# Patient Record
Sex: MALE | Race: WHITE
[De-identification: names, ages, dates, MRNs, and addresses within clinical notes are randomized per-mention and may not be internally consistent; named-entity substitution may affect disease eponyms.]

---

## 2018-01-29 ENCOUNTER — HOSPITAL ENCOUNTER (OUTPATIENT)
Age: 73
Discharge: HOME | End: 2018-01-29

## 2018-01-29 ENCOUNTER — HOSPITAL ENCOUNTER (OUTPATIENT)
Dept: HOSPITAL 91 - GIL | Age: 73
Discharge: HOME | End: 2018-01-29
Payer: COMMERCIAL

## 2018-01-29 DIAGNOSIS — K31.89: ICD-10-CM

## 2018-01-29 DIAGNOSIS — E78.5: ICD-10-CM

## 2018-01-29 DIAGNOSIS — K57.90: ICD-10-CM

## 2018-01-29 DIAGNOSIS — K43.9: ICD-10-CM

## 2018-01-29 DIAGNOSIS — K76.6: ICD-10-CM

## 2018-01-29 DIAGNOSIS — E11.9: ICD-10-CM

## 2018-01-29 DIAGNOSIS — I85.00: Primary | ICD-10-CM

## 2018-01-29 DIAGNOSIS — K64.9: ICD-10-CM

## 2018-01-29 PROCEDURE — 43239 EGD BIOPSY SINGLE/MULTIPLE: CPT

## 2018-01-29 PROCEDURE — 82962 GLUCOSE BLOOD TEST: CPT

## 2020-08-25 ENCOUNTER — OFFICE (OUTPATIENT)
Dept: URBAN - METROPOLITAN AREA CLINIC 33 | Facility: CLINIC | Age: 75
End: 2020-08-25

## 2020-08-25 VITALS
HEIGHT: 67 IN | SYSTOLIC BLOOD PRESSURE: 150 MMHG | WEIGHT: 164 LBS | TEMPERATURE: 96.8 F | DIASTOLIC BLOOD PRESSURE: 82 MMHG

## 2020-08-25 DIAGNOSIS — K74.69 CIRRHOSIS, OTHER: ICD-10-CM

## 2020-08-25 DIAGNOSIS — R19.09 MASS OF PANCREAS: ICD-10-CM

## 2020-08-25 DIAGNOSIS — I85.00 ESOPHAGEAL VARICES: ICD-10-CM

## 2020-08-25 PROCEDURE — 99205 OFFICE O/P NEW HI 60 MIN: CPT | Performed by: SPECIALIST

## 2020-08-25 NOTE — SERVICEHPINOTES
The patient complains of symptoms suggestive of excessive intestinal gas.     Reports the onset of   bloating  several  months   ago  .    Symptoms now occur   several   times per   weeks  .    They may last up to   hours   at a time  .    Medications tried so far include   with   no   relief  .    Symptoms are worsened by   nothing specific  .   Bloating/gas is relieved by   nothing specific  .    Specific foods that potentially trigger symptoms include   milk/lactose  .    The patient has associated   abdominal bloating   with this presentation.   Alarm symptoms reported:   none  .

## 2020-09-04 ENCOUNTER — OFFICE (OUTPATIENT)
Dept: URBAN - METROPOLITAN AREA CLINIC 33 | Facility: CLINIC | Age: 75
End: 2020-09-04

## 2020-09-04 VITALS
HEIGHT: 67 IN | SYSTOLIC BLOOD PRESSURE: 136 MMHG | HEART RATE: 62 BPM | TEMPERATURE: 98.9 F | WEIGHT: 164 LBS | DIASTOLIC BLOOD PRESSURE: 77 MMHG

## 2020-09-04 DIAGNOSIS — I85.00 ESOPHAGEAL VARICES: ICD-10-CM

## 2020-09-04 DIAGNOSIS — K74.69 CIRRHOSIS, OTHER: ICD-10-CM

## 2020-09-04 DIAGNOSIS — R19.09 MASS OF PANCREAS: ICD-10-CM

## 2020-09-04 PROCEDURE — 99214 OFFICE O/P EST MOD 30 MIN: CPT | Performed by: SPECIALIST

## 2020-09-04 NOTE — SERVICEHPINOTES
Discussed MRCP results, unfortunately due to patient not being Cooperative with exam and not following technician instructions on respiration the exam is limited.

## 2020-09-30 ENCOUNTER — OFFICE (OUTPATIENT)
Dept: URBAN - METROPOLITAN AREA CLINIC 33 | Facility: CLINIC | Age: 75
End: 2020-09-30

## 2020-09-30 VITALS
TEMPERATURE: 97.4 F | SYSTOLIC BLOOD PRESSURE: 134 MMHG | WEIGHT: 164 LBS | HEIGHT: 67 IN | DIASTOLIC BLOOD PRESSURE: 64 MMHG

## 2020-09-30 DIAGNOSIS — I85.00 ESOPHAGEAL VARICES: ICD-10-CM

## 2020-09-30 DIAGNOSIS — R19.09 MASS OF PANCREAS: ICD-10-CM

## 2020-09-30 DIAGNOSIS — K74.69 CIRRHOSIS, OTHER: ICD-10-CM

## 2020-09-30 PROCEDURE — 99214 OFFICE O/P EST MOD 30 MIN: CPT | Performed by: SPECIALIST

## 2020-09-30 NOTE — SERVICEHPINOTES
MRI confirms finding of 1.9 cm mass    in [place]. He has Cirrhosis, portal hypertension and esophageal varicies. Patient and daughter understand EUS is a high risk procedure in this setting.

## 2020-12-02 ENCOUNTER — OFFICE (OUTPATIENT)
Dept: URBAN - METROPOLITAN AREA CLINIC 33 | Facility: CLINIC | Age: 75
End: 2020-12-02

## 2020-12-02 VITALS — HEIGHT: 67 IN

## 2020-12-02 DIAGNOSIS — I85.00 ESOPHAGEAL VARICES: ICD-10-CM

## 2020-12-02 DIAGNOSIS — K74.69 CIRRHOSIS, OTHER: ICD-10-CM

## 2020-12-02 DIAGNOSIS — R19.09 MASS OF PANCREAS: ICD-10-CM

## 2020-12-02 PROCEDURE — 99214 OFFICE O/P EST MOD 30 MIN: CPT | Performed by: SPECIALIST

## 2020-12-02 PROCEDURE — G0406 INPT/TELE FOLLOW UP 15: HCPCS | Performed by: SPECIALIST

## 2022-08-05 ENCOUNTER — HOSPITAL ENCOUNTER (EMERGENCY)
Dept: HOSPITAL 12 - ER | Age: 77
LOS: 1 days | Discharge: ANOTHER HOSPITAL | End: 2022-08-06
Payer: MEDICARE

## 2022-08-05 VITALS — HEIGHT: 66 IN | WEIGHT: 125 LBS | BODY MASS INDEX: 20.09 KG/M2

## 2022-08-05 DIAGNOSIS — F03.90: ICD-10-CM

## 2022-08-05 DIAGNOSIS — I50.9: ICD-10-CM

## 2022-08-05 DIAGNOSIS — K21.9: ICD-10-CM

## 2022-08-05 DIAGNOSIS — N40.0: ICD-10-CM

## 2022-08-05 DIAGNOSIS — E11.9: ICD-10-CM

## 2022-08-05 DIAGNOSIS — I62.01: ICD-10-CM

## 2022-08-05 DIAGNOSIS — E72.20: Primary | ICD-10-CM

## 2022-08-05 DIAGNOSIS — Z20.822: ICD-10-CM

## 2022-08-05 DIAGNOSIS — Z79.899: ICD-10-CM

## 2022-08-05 DIAGNOSIS — K76.9: ICD-10-CM

## 2022-08-05 DIAGNOSIS — N39.0: ICD-10-CM

## 2022-08-05 DIAGNOSIS — Z74.01: ICD-10-CM

## 2022-08-05 DIAGNOSIS — Z79.84: ICD-10-CM

## 2022-08-05 DIAGNOSIS — R41.0: ICD-10-CM

## 2022-08-05 LAB
ALP SERPL-CCNC: 149 U/L (ref 50–136)
ALT SERPL W/O P-5'-P-CCNC: 17 U/L (ref 16–63)
AMPHETAMINES UR QL SCN>1000 NG/ML: NEGATIVE
APAP SERPL-MCNC: < 2 UG/ML (ref 10–30)
APPEARANCE UR: (no result)
AST SERPL-CCNC: 22 U/L (ref 15–37)
BILIRUB DIRECT SERPL-MCNC: 0.4 MG/DL (ref 0–0.2)
BILIRUB SERPL-MCNC: 1.3 MG/DL (ref 0.2–1)
BILIRUB UR QL STRIP: NEGATIVE
BUN SERPL-MCNC: 17 MG/DL (ref 7–18)
CHLORIDE SERPL-SCNC: 105 MMOL/L (ref 98–107)
CO2 SERPL-SCNC: 24 MMOL/L (ref 21–32)
COCAINE UR QL SCN: NEGATIVE
COLOR UR: YELLOW
CREAT SERPL-MCNC: 1.4 MG/DL (ref 0.6–1.3)
DEPRECATED SQUAMOUS URNS QL MICRO: (no result) /HPF
ETHANOL SERPL-MCNC: < 3 MG/DL (ref 0–0)
GLUCOSE SERPL-MCNC: 228 MG/DL (ref 74–106)
GLUCOSE UR STRIP-MCNC: NEGATIVE MG/DL
HCT VFR BLD AUTO: 36.8 % (ref 36.7–47.1)
HGB UR QL STRIP: (no result)
KETONES UR STRIP-MCNC: NEGATIVE MG/DL
LEUKOCYTE ESTERASE UR QL STRIP: (no result)
MCH RBC QN AUTO: 33.4 UUG (ref 23.8–33.4)
MCV RBC AUTO: 97.7 FL (ref 73–96.2)
NITRITE UR QL STRIP: POSITIVE
OPIATES UR QL SCN: NEGATIVE
PCP UR QL SCN>25 NG/ML: NEGATIVE
PH UR STRIP: 5.5 [PH] (ref 5–8)
PLATELET # BLD AUTO: 92 K/UL (ref 152–348)
POTASSIUM SERPL-SCNC: 4.2 MMOL/L (ref 3.5–5.1)
RBC #/AREA URNS HPF: (no result) /HPF (ref 0–3)
SP GR UR STRIP: 1.02 (ref 1–1.03)
THC UR QL SCN>50 NG/ML: NEGATIVE
TSH SERPL DL<=0.005 MIU/L-ACNC: 1.68 MIU/ML (ref 0.36–3.74)
UROBILINOGEN UR STRIP-MCNC: 0.2 E.U./DL
WBC #/AREA URNS HPF: (no result) /HPF
WBC #/AREA URNS HPF: (no result) /HPF (ref 0–3)
WS STN SPEC: 8.5 G/DL (ref 6.4–8.2)

## 2022-08-05 PROCEDURE — 71045 X-RAY EXAM CHEST 1 VIEW: CPT

## 2022-08-05 PROCEDURE — 82140 ASSAY OF AMMONIA: CPT

## 2022-08-05 PROCEDURE — 80076 HEPATIC FUNCTION PANEL: CPT

## 2022-08-05 PROCEDURE — 87086 URINE CULTURE/COLONY COUNT: CPT

## 2022-08-05 PROCEDURE — 87040 BLOOD CULTURE FOR BACTERIA: CPT

## 2022-08-05 PROCEDURE — 80299 QUANTITATIVE ASSAY DRUG: CPT

## 2022-08-05 PROCEDURE — 84484 ASSAY OF TROPONIN QUANT: CPT

## 2022-08-05 PROCEDURE — 96375 TX/PRO/DX INJ NEW DRUG ADDON: CPT

## 2022-08-05 PROCEDURE — 80048 BASIC METABOLIC PNL TOTAL CA: CPT

## 2022-08-05 PROCEDURE — 93005 ELECTROCARDIOGRAM TRACING: CPT

## 2022-08-05 PROCEDURE — 83605 ASSAY OF LACTIC ACID: CPT

## 2022-08-05 PROCEDURE — 80307 DRUG TEST PRSMV CHEM ANLYZR: CPT

## 2022-08-05 PROCEDURE — 87426 SARSCOV CORONAVIRUS AG IA: CPT

## 2022-08-05 PROCEDURE — 70450 CT HEAD/BRAIN W/O DYE: CPT

## 2022-08-05 PROCEDURE — 85025 COMPLETE CBC W/AUTO DIFF WBC: CPT

## 2022-08-05 PROCEDURE — G0480 DRUG TEST DEF 1-7 CLASSES: HCPCS

## 2022-08-05 PROCEDURE — 99291 CRITICAL CARE FIRST HOUR: CPT

## 2022-08-05 PROCEDURE — 81001 URINALYSIS AUTO W/SCOPE: CPT

## 2022-08-05 PROCEDURE — 80320 DRUG SCREEN QUANTALCOHOLS: CPT

## 2022-08-05 PROCEDURE — A4663 DIALYSIS BLOOD PRESSURE CUFF: HCPCS

## 2022-08-05 PROCEDURE — 84443 ASSAY THYROID STIM HORMONE: CPT

## 2022-08-05 PROCEDURE — 85730 THROMBOPLASTIN TIME PARTIAL: CPT

## 2022-08-05 PROCEDURE — 96365 THER/PROPH/DIAG IV INF INIT: CPT

## 2022-08-05 PROCEDURE — 36415 COLL VENOUS BLD VENIPUNCTURE: CPT

## 2022-08-05 NOTE — NUR
spoke with Anju at the transfer center as the pt will be transferred to 
[facility]. She requested I fax the face sheet and covid results.

## 2022-08-05 NOTE — NUR
Per Dr. Saucedo he received a call back from Dr. Scruggs and the pt will go to 
[facility].

## 2022-08-06 NOTE — NUR
-------------------------------------------------------------------------------

           *** Note undone in [facility] - 08/06/22 at 0203 by PATRICIO ***            

-------------------------------------------------------------------------------

pt was transported via w/c to room 329 martin Trujillo in room to receive the 
pt.

## 2022-08-06 NOTE — NUR
spoke with Elsie at [place] center at  states the pt will go to 
room 4411 and number for report is  at [facility].

## 2022-08-06 NOTE — NUR
report was given to Abdoul BALLARD with [facility] transport pt to go to [facility] room 4411.  I called [facility] at  and spoke 
with Tres the monitor technician to inform them that the pt will now be 
leaving with [facility] transport.

## 2022-08-06 NOTE — NUR
call to [facility] at .  Was told by Anju that they 
are still waiting to find out about transfer.

## 2022-08-06 NOTE — NUR
report given to Pearl BALLARD at [facility] to go to room 4411 number 
that was called was .   I told her I do not have a transport time 
as of yet but I will call her when I am notified.

## 2022-08-06 NOTE — NUR
spoke with Anju at the transfer center she states they are still unaware of 
when transport will arrive they do not have a time currently.

## 2022-09-19 ENCOUNTER — HOSPITAL ENCOUNTER (INPATIENT)
Dept: HOSPITAL 12 - ER | Age: 77
LOS: 9 days | Discharge: NURSING, REHAB OR LONG-TERM CARE | DRG: 871 | End: 2022-09-28
Payer: MEDICARE

## 2022-09-19 VITALS — DIASTOLIC BLOOD PRESSURE: 76 MMHG | SYSTOLIC BLOOD PRESSURE: 135 MMHG

## 2022-09-19 VITALS — DIASTOLIC BLOOD PRESSURE: 73 MMHG | SYSTOLIC BLOOD PRESSURE: 146 MMHG

## 2022-09-19 VITALS — DIASTOLIC BLOOD PRESSURE: 51 MMHG | SYSTOLIC BLOOD PRESSURE: 84 MMHG

## 2022-09-19 VITALS — DIASTOLIC BLOOD PRESSURE: 61 MMHG | SYSTOLIC BLOOD PRESSURE: 111 MMHG

## 2022-09-19 VITALS — BODY MASS INDEX: 19.85 KG/M2 | WEIGHT: 131 LBS | HEIGHT: 68 IN

## 2022-09-19 VITALS — DIASTOLIC BLOOD PRESSURE: 52 MMHG | SYSTOLIC BLOOD PRESSURE: 91 MMHG

## 2022-09-19 DIAGNOSIS — N40.0: ICD-10-CM

## 2022-09-19 DIAGNOSIS — J94.8: ICD-10-CM

## 2022-09-19 DIAGNOSIS — N17.9: ICD-10-CM

## 2022-09-19 DIAGNOSIS — D68.9: ICD-10-CM

## 2022-09-19 DIAGNOSIS — K76.7: ICD-10-CM

## 2022-09-19 DIAGNOSIS — D69.59: ICD-10-CM

## 2022-09-19 DIAGNOSIS — D75.89: ICD-10-CM

## 2022-09-19 DIAGNOSIS — E83.39: ICD-10-CM

## 2022-09-19 DIAGNOSIS — A41.9: Primary | ICD-10-CM

## 2022-09-19 DIAGNOSIS — J69.0: ICD-10-CM

## 2022-09-19 DIAGNOSIS — K70.31: ICD-10-CM

## 2022-09-19 DIAGNOSIS — N18.30: ICD-10-CM

## 2022-09-19 DIAGNOSIS — Z16.12: ICD-10-CM

## 2022-09-19 DIAGNOSIS — Z79.84: ICD-10-CM

## 2022-09-19 DIAGNOSIS — K72.90: ICD-10-CM

## 2022-09-19 DIAGNOSIS — E87.6: ICD-10-CM

## 2022-09-19 DIAGNOSIS — I13.0: ICD-10-CM

## 2022-09-19 DIAGNOSIS — B96.20: ICD-10-CM

## 2022-09-19 DIAGNOSIS — E83.42: ICD-10-CM

## 2022-09-19 DIAGNOSIS — F03.90: ICD-10-CM

## 2022-09-19 DIAGNOSIS — N39.0: ICD-10-CM

## 2022-09-19 DIAGNOSIS — J98.11: ICD-10-CM

## 2022-09-19 DIAGNOSIS — I50.9: ICD-10-CM

## 2022-09-19 DIAGNOSIS — K21.9: ICD-10-CM

## 2022-09-19 DIAGNOSIS — Z66: ICD-10-CM

## 2022-09-19 DIAGNOSIS — J96.01: ICD-10-CM

## 2022-09-19 DIAGNOSIS — E11.22: ICD-10-CM

## 2022-09-19 DIAGNOSIS — E87.2: ICD-10-CM

## 2022-09-19 DIAGNOSIS — I62.00: ICD-10-CM

## 2022-09-19 DIAGNOSIS — J90: ICD-10-CM

## 2022-09-19 DIAGNOSIS — D64.9: ICD-10-CM

## 2022-09-19 DIAGNOSIS — E43: ICD-10-CM

## 2022-09-19 DIAGNOSIS — Z87.440: ICD-10-CM

## 2022-09-19 DIAGNOSIS — R65.21: ICD-10-CM

## 2022-09-19 DIAGNOSIS — E88.09: ICD-10-CM

## 2022-09-19 DIAGNOSIS — Z20.822: ICD-10-CM

## 2022-09-19 LAB
ALP SERPL-CCNC: 119 U/L (ref 50–136)
ALT SERPL W/O P-5'-P-CCNC: 16 U/L (ref 16–63)
AST SERPL-CCNC: 29 U/L (ref 15–37)
BILIRUB DIRECT SERPL-MCNC: 1 MG/DL (ref 0–0.2)
BILIRUB SERPL-MCNC: 2.9 MG/DL (ref 0.2–1)
BUN SERPL-MCNC: 19 MG/DL (ref 7–18)
CHLORIDE SERPL-SCNC: 104 MMOL/L (ref 98–107)
CO2 SERPL-SCNC: 21 MMOL/L (ref 21–32)
CREAT SERPL-MCNC: 1.5 MG/DL (ref 0.6–1.3)
GLUCOSE SERPL-MCNC: 169 MG/DL (ref 74–106)
HCT VFR BLD AUTO: 38.1 % (ref 36.7–47.1)
MCH RBC QN AUTO: 36 UUG (ref 23.8–33.4)
MCV RBC AUTO: 105.8 FL (ref 73–96.2)
PLATELET # BLD AUTO: 107 K/UL (ref 152–348)
POTASSIUM SERPL-SCNC: 4.8 MMOL/L (ref 3.5–5.1)
WS STN SPEC: 8.6 G/DL (ref 6.4–8.2)

## 2022-09-19 PROCEDURE — 0BH17EZ INSERTION OF ENDOTRACHEAL AIRWAY INTO TRACHEA, VIA NATURAL OR ARTIFICIAL OPENING: ICD-10-PCS

## 2022-09-19 PROCEDURE — A6213 FOAM DRG >16<=48 SQ IN W/BDR: HCPCS

## 2022-09-19 PROCEDURE — A6209 FOAM DRSG <=16 SQ IN W/O BDR: HCPCS

## 2022-09-19 PROCEDURE — C9113 INJ PANTOPRAZOLE SODIUM, VIA: HCPCS

## 2022-09-19 PROCEDURE — G0378 HOSPITAL OBSERVATION PER HR: HCPCS

## 2022-09-19 PROCEDURE — B548ZZA ULTRASONOGRAPHY OF SUPERIOR VENA CAVA, GUIDANCE: ICD-10-PCS

## 2022-09-19 PROCEDURE — 5A1945Z RESPIRATORY VENTILATION, 24-96 CONSECUTIVE HOURS: ICD-10-PCS | Performed by: INTERNAL MEDICINE

## 2022-09-19 PROCEDURE — 02HV33Z INSERTION OF INFUSION DEVICE INTO SUPERIOR VENA CAVA, PERCUTANEOUS APPROACH: ICD-10-PCS

## 2022-09-19 PROCEDURE — P9047 ALBUMIN (HUMAN), 25%, 50ML: HCPCS

## 2022-09-19 RX ADMIN — RIFAXIMIN SCH MG: 550 TABLET ORAL at 23:43

## 2022-09-19 RX ADMIN — LACTULOSE SCH G: 20 SOLUTION ORAL at 21:30

## 2022-09-19 NOTE — NUR
Pt received report from NELLIE Lantigua,pt is intubated and sedated on propofol. Tube size 7.5 
with 24cm lip line.VENT on rate 16, Tv 450, peep 5, FIO2 100%, O2 sat within desired limits. 
Cardiac wise, NSR, SBP within normal limits. NG -T inserted and clamped. . Bledsoe Catheter 16 
inserted and is on gravity with adequate output.

## 2022-09-19 NOTE — NUR
called outside pharmacy,spoke to Chiquis, verified 2 lactulose orders from 2 different 
DRs. one is now and one is QID.Advised to give the stat one and go to the routine time on 
the other meds

## 2022-09-20 VITALS — SYSTOLIC BLOOD PRESSURE: 85 MMHG | DIASTOLIC BLOOD PRESSURE: 47 MMHG

## 2022-09-20 VITALS — SYSTOLIC BLOOD PRESSURE: 90 MMHG | DIASTOLIC BLOOD PRESSURE: 50 MMHG

## 2022-09-20 VITALS — DIASTOLIC BLOOD PRESSURE: 65 MMHG | SYSTOLIC BLOOD PRESSURE: 123 MMHG

## 2022-09-20 VITALS — DIASTOLIC BLOOD PRESSURE: 52 MMHG | SYSTOLIC BLOOD PRESSURE: 97 MMHG

## 2022-09-20 VITALS — SYSTOLIC BLOOD PRESSURE: 88 MMHG | DIASTOLIC BLOOD PRESSURE: 50 MMHG

## 2022-09-20 VITALS — SYSTOLIC BLOOD PRESSURE: 104 MMHG | DIASTOLIC BLOOD PRESSURE: 65 MMHG

## 2022-09-20 VITALS — DIASTOLIC BLOOD PRESSURE: 46 MMHG | SYSTOLIC BLOOD PRESSURE: 83 MMHG

## 2022-09-20 VITALS — DIASTOLIC BLOOD PRESSURE: 49 MMHG | SYSTOLIC BLOOD PRESSURE: 79 MMHG

## 2022-09-20 VITALS — SYSTOLIC BLOOD PRESSURE: 130 MMHG | DIASTOLIC BLOOD PRESSURE: 71 MMHG

## 2022-09-20 VITALS — SYSTOLIC BLOOD PRESSURE: 128 MMHG | DIASTOLIC BLOOD PRESSURE: 70 MMHG

## 2022-09-20 VITALS — SYSTOLIC BLOOD PRESSURE: 104 MMHG | DIASTOLIC BLOOD PRESSURE: 53 MMHG

## 2022-09-20 VITALS — SYSTOLIC BLOOD PRESSURE: 97 MMHG | DIASTOLIC BLOOD PRESSURE: 49 MMHG

## 2022-09-20 VITALS — SYSTOLIC BLOOD PRESSURE: 126 MMHG | DIASTOLIC BLOOD PRESSURE: 77 MMHG

## 2022-09-20 VITALS — DIASTOLIC BLOOD PRESSURE: 52 MMHG | SYSTOLIC BLOOD PRESSURE: 101 MMHG

## 2022-09-20 VITALS — DIASTOLIC BLOOD PRESSURE: 65 MMHG | SYSTOLIC BLOOD PRESSURE: 115 MMHG

## 2022-09-20 VITALS — DIASTOLIC BLOOD PRESSURE: 53 MMHG | SYSTOLIC BLOOD PRESSURE: 92 MMHG

## 2022-09-20 VITALS — DIASTOLIC BLOOD PRESSURE: 45 MMHG | SYSTOLIC BLOOD PRESSURE: 75 MMHG

## 2022-09-20 VITALS — SYSTOLIC BLOOD PRESSURE: 99 MMHG | DIASTOLIC BLOOD PRESSURE: 61 MMHG

## 2022-09-20 VITALS — SYSTOLIC BLOOD PRESSURE: 100 MMHG | DIASTOLIC BLOOD PRESSURE: 53 MMHG

## 2022-09-20 VITALS — SYSTOLIC BLOOD PRESSURE: 101 MMHG | DIASTOLIC BLOOD PRESSURE: 57 MMHG

## 2022-09-20 VITALS — SYSTOLIC BLOOD PRESSURE: 125 MMHG | DIASTOLIC BLOOD PRESSURE: 68 MMHG

## 2022-09-20 VITALS — SYSTOLIC BLOOD PRESSURE: 105 MMHG | DIASTOLIC BLOOD PRESSURE: 49 MMHG

## 2022-09-20 VITALS — SYSTOLIC BLOOD PRESSURE: 87 MMHG | DIASTOLIC BLOOD PRESSURE: 45 MMHG

## 2022-09-20 VITALS — DIASTOLIC BLOOD PRESSURE: 56 MMHG | SYSTOLIC BLOOD PRESSURE: 107 MMHG

## 2022-09-20 VITALS — SYSTOLIC BLOOD PRESSURE: 95 MMHG | DIASTOLIC BLOOD PRESSURE: 54 MMHG

## 2022-09-20 VITALS — SYSTOLIC BLOOD PRESSURE: 116 MMHG | DIASTOLIC BLOOD PRESSURE: 60 MMHG

## 2022-09-20 VITALS — SYSTOLIC BLOOD PRESSURE: 129 MMHG | DIASTOLIC BLOOD PRESSURE: 60 MMHG

## 2022-09-20 VITALS — SYSTOLIC BLOOD PRESSURE: 85 MMHG | DIASTOLIC BLOOD PRESSURE: 51 MMHG

## 2022-09-20 VITALS — SYSTOLIC BLOOD PRESSURE: 108 MMHG | DIASTOLIC BLOOD PRESSURE: 57 MMHG

## 2022-09-20 VITALS — DIASTOLIC BLOOD PRESSURE: 53 MMHG | SYSTOLIC BLOOD PRESSURE: 94 MMHG

## 2022-09-20 VITALS — SYSTOLIC BLOOD PRESSURE: 107 MMHG | DIASTOLIC BLOOD PRESSURE: 56 MMHG

## 2022-09-20 VITALS — SYSTOLIC BLOOD PRESSURE: 97 MMHG | DIASTOLIC BLOOD PRESSURE: 56 MMHG

## 2022-09-20 VITALS — DIASTOLIC BLOOD PRESSURE: 60 MMHG | SYSTOLIC BLOOD PRESSURE: 104 MMHG

## 2022-09-20 VITALS — DIASTOLIC BLOOD PRESSURE: 62 MMHG | SYSTOLIC BLOOD PRESSURE: 138 MMHG

## 2022-09-20 VITALS — DIASTOLIC BLOOD PRESSURE: 54 MMHG | SYSTOLIC BLOOD PRESSURE: 96 MMHG

## 2022-09-20 VITALS — DIASTOLIC BLOOD PRESSURE: 61 MMHG | SYSTOLIC BLOOD PRESSURE: 99 MMHG

## 2022-09-20 VITALS — DIASTOLIC BLOOD PRESSURE: 57 MMHG | SYSTOLIC BLOOD PRESSURE: 108 MMHG

## 2022-09-20 VITALS — SYSTOLIC BLOOD PRESSURE: 92 MMHG | DIASTOLIC BLOOD PRESSURE: 53 MMHG

## 2022-09-20 VITALS — SYSTOLIC BLOOD PRESSURE: 87 MMHG | DIASTOLIC BLOOD PRESSURE: 48 MMHG

## 2022-09-20 VITALS — DIASTOLIC BLOOD PRESSURE: 57 MMHG | SYSTOLIC BLOOD PRESSURE: 110 MMHG

## 2022-09-20 VITALS — SYSTOLIC BLOOD PRESSURE: 107 MMHG | DIASTOLIC BLOOD PRESSURE: 59 MMHG

## 2022-09-20 VITALS — SYSTOLIC BLOOD PRESSURE: 122 MMHG | DIASTOLIC BLOOD PRESSURE: 65 MMHG

## 2022-09-20 VITALS — SYSTOLIC BLOOD PRESSURE: 111 MMHG | DIASTOLIC BLOOD PRESSURE: 60 MMHG

## 2022-09-20 VITALS — DIASTOLIC BLOOD PRESSURE: 59 MMHG | SYSTOLIC BLOOD PRESSURE: 106 MMHG

## 2022-09-20 VITALS — SYSTOLIC BLOOD PRESSURE: 120 MMHG | DIASTOLIC BLOOD PRESSURE: 71 MMHG

## 2022-09-20 VITALS — SYSTOLIC BLOOD PRESSURE: 83 MMHG | DIASTOLIC BLOOD PRESSURE: 47 MMHG

## 2022-09-20 VITALS — SYSTOLIC BLOOD PRESSURE: 113 MMHG | DIASTOLIC BLOOD PRESSURE: 60 MMHG

## 2022-09-20 VITALS — SYSTOLIC BLOOD PRESSURE: 100 MMHG | DIASTOLIC BLOOD PRESSURE: 55 MMHG

## 2022-09-20 VITALS — DIASTOLIC BLOOD PRESSURE: 52 MMHG | SYSTOLIC BLOOD PRESSURE: 95 MMHG

## 2022-09-20 VITALS — DIASTOLIC BLOOD PRESSURE: 54 MMHG | SYSTOLIC BLOOD PRESSURE: 105 MMHG

## 2022-09-20 VITALS — SYSTOLIC BLOOD PRESSURE: 127 MMHG | DIASTOLIC BLOOD PRESSURE: 71 MMHG

## 2022-09-20 VITALS — SYSTOLIC BLOOD PRESSURE: 121 MMHG | DIASTOLIC BLOOD PRESSURE: 58 MMHG

## 2022-09-20 VITALS — SYSTOLIC BLOOD PRESSURE: 98 MMHG | DIASTOLIC BLOOD PRESSURE: 50 MMHG

## 2022-09-20 VITALS — SYSTOLIC BLOOD PRESSURE: 85 MMHG | DIASTOLIC BLOOD PRESSURE: 48 MMHG

## 2022-09-20 VITALS — DIASTOLIC BLOOD PRESSURE: 56 MMHG | SYSTOLIC BLOOD PRESSURE: 104 MMHG

## 2022-09-20 LAB
ALP SERPL-CCNC: 92 U/L (ref 50–136)
ALT SERPL W/O P-5'-P-CCNC: 17 U/L (ref 16–63)
APPEARANCE UR: CLEAR
AST SERPL-CCNC: 22 U/L (ref 15–37)
BASE EXCESS BLDA CALC-SCNC: -5.2 MMOL/L
BASE EXCESS BLDA CALC-SCNC: -6 MMOL/L
BILIRUB SERPL-MCNC: 2.7 MG/DL (ref 0.2–1)
BILIRUB UR QL STRIP: NEGATIVE
BUN SERPL-MCNC: 22 MG/DL (ref 7–18)
CHLORIDE SERPL-SCNC: 107 MMOL/L (ref 98–107)
CO2 SERPL-SCNC: 19 MMOL/L (ref 21–32)
COLOR UR: YELLOW
CREAT SERPL-MCNC: 1.5 MG/DL (ref 0.6–1.3)
DEPRECATED SQUAMOUS URNS QL MICRO: (no result) /HPF
GLUCOSE SERPL-MCNC: 170 MG/DL (ref 74–106)
GLUCOSE UR STRIP-MCNC: NEGATIVE MG/DL
HCO3 BLDA-SCNC: 16 MMOL/L
HCO3 BLDA-SCNC: 16.4 MMOL/L
HCT VFR BLD AUTO: 32.2 % (ref 36.7–47.1)
HGB BLDA OXIMETRY-MCNC: 11.2 G/DL (ref 13.5–18)
HGB BLDA OXIMETRY-MCNC: 12.2 G/DL (ref 13.5–18)
HGB UR QL STRIP: (no result)
INHALED O2 CONCENTRATION: 100 %
INHALED O2 CONCENTRATION: 80 %
INTRINSIC PEEP RESPIRATORY: 5 CMH20
INTRINSIC PEEP RESPIRATORY: 5 CMH20
KETONES UR STRIP-MCNC: (no result) MG/DL
LEUKOCYTE ESTERASE UR QL STRIP: (no result)
MCH RBC QN AUTO: 36.5 UUG (ref 23.8–33.4)
MCV RBC AUTO: 104.8 FL (ref 73–96.2)
NITRITE UR QL STRIP: NEGATIVE
PCO2 TEMP ADJ BLDA: 21.8 MMHG (ref 35–45)
PCO2 TEMP ADJ BLDA: 22.9 MMHG (ref 35–45)
PEEP SETTING VENT: 45 ML
PEEP SETTING VENT: 450 ML
PH TEMP ADJ BLDA: 7.46 [PH] (ref 7.35–7.45)
PH TEMP ADJ BLDA: 7.5 [PH] (ref 7.35–7.45)
PH UR STRIP: 5.5 [PH] (ref 5–8)
PHOSPHATE SERPL-MCNC: 3.7 MG/DL (ref 2.5–4.9)
PLATELET # BLD AUTO: 91 K/UL (ref 152–348)
PO2 TEMP ADJ BLDA: 108.3 MMHG (ref 75–100)
PO2 TEMP ADJ BLDA: 170.1 MMHG (ref 75–100)
POTASSIUM SERPL-SCNC: 3.7 MMOL/L (ref 3.5–5.1)
RBC #/AREA URNS HPF: (no result) /HPF (ref 0–3)
SET RATE, BG: 16
SET RATE, BG: 16
SP GR UR STRIP: 1.02 (ref 1–1.03)
SPECIMEN DRAWN FROM PATIENT: (no result)
SPECIMEN DRAWN FROM PATIENT: (no result)
UROBILINOGEN UR STRIP-MCNC: 0.2 E.U./DL
VENTILATION MODE VENT: (no result)
VENTILATION MODE VENT: (no result)
WBC #/AREA URNS HPF: (no result) /HPF
WS STN SPEC: 7.3 G/DL (ref 6.4–8.2)

## 2022-09-20 RX ADMIN — TAMSULOSIN HYDROCHLORIDE SCH MG: 0.4 CAPSULE ORAL at 09:37

## 2022-09-20 RX ADMIN — Medication SCH EACH: at 06:35

## 2022-09-20 RX ADMIN — FLUTICASONE FUROATE AND VILANTEROL TRIFENATATE SCH EACH: 100; 25 POWDER RESPIRATORY (INHALATION) at 09:00

## 2022-09-20 RX ADMIN — PROPRANOLOL HYDROCHLORIDE SCH MG: 10 TABLET ORAL at 09:00

## 2022-09-20 RX ADMIN — LACTULOSE SCH G: 20 SOLUTION ORAL at 13:16

## 2022-09-20 RX ADMIN — PROPRANOLOL HYDROCHLORIDE SCH MG: 10 TABLET ORAL at 16:17

## 2022-09-20 RX ADMIN — Medication SCH EACH: at 12:08

## 2022-09-20 RX ADMIN — HEPARIN SODIUM SCH UNITS: 5000 INJECTION, SOLUTION INTRAVENOUS; SUBCUTANEOUS at 20:12

## 2022-09-20 RX ADMIN — PIPERACILLIN SODIUM AND TAZOBACTAM SODIUM SCH MLS/HR: .375; 3 INJECTION, POWDER, LYOPHILIZED, FOR SOLUTION INTRAVENOUS at 23:24

## 2022-09-20 RX ADMIN — LACTULOSE SCH G: 20 SOLUTION ORAL at 16:16

## 2022-09-20 RX ADMIN — MEMANTINE HYDROCHLORIDE SCH MG: 10 TABLET ORAL at 20:30

## 2022-09-20 RX ADMIN — PIPERACILLIN SODIUM AND TAZOBACTAM SODIUM SCH MLS/HR: .375; 3 INJECTION, POWDER, LYOPHILIZED, FOR SOLUTION INTRAVENOUS at 16:13

## 2022-09-20 RX ADMIN — SODIUM CHLORIDE PRN UNIT: 9 INJECTION, SOLUTION INTRAVENOUS at 16:45

## 2022-09-20 RX ADMIN — Medication SCH EACH: at 16:44

## 2022-09-20 RX ADMIN — SODIUM CHLORIDE PRN UNIT: 9 INJECTION, SOLUTION INTRAVENOUS at 12:13

## 2022-09-20 RX ADMIN — LACTULOSE SCH G: 20 SOLUTION ORAL at 09:35

## 2022-09-20 RX ADMIN — Medication SCH EACH: at 20:51

## 2022-09-20 RX ADMIN — LACTULOSE SCH G: 20 SOLUTION ORAL at 20:30

## 2022-09-20 RX ADMIN — HEPARIN SODIUM SCH UNITS: 5000 INJECTION, SOLUTION INTRAVENOUS; SUBCUTANEOUS at 09:40

## 2022-09-20 RX ADMIN — SODIUM CHLORIDE PRN UNIT: 9 INJECTION, SOLUTION INTRAVENOUS at 21:02

## 2022-09-20 RX ADMIN — PROPOFOL PRN MLS/HR: 10 INJECTION, EMULSION INTRAVENOUS at 16:13

## 2022-09-20 RX ADMIN — PIPERACILLIN SODIUM AND TAZOBACTAM SODIUM SCH MLS/HR: .375; 3 INJECTION, POWDER, LYOPHILIZED, FOR SOLUTION INTRAVENOUS at 09:29

## 2022-09-20 RX ADMIN — ATORVASTATIN CALCIUM SCH MG: 20 TABLET, FILM COATED ORAL at 20:30

## 2022-09-20 RX ADMIN — RIFAXIMIN SCH MG: 550 TABLET ORAL at 09:37

## 2022-09-20 RX ADMIN — OXYBUTYNIN CHLORIDE SCH MG: 5 TABLET, EXTENDED RELEASE ORAL at 20:30

## 2022-09-20 RX ADMIN — RIFAXIMIN SCH MG: 550 TABLET ORAL at 20:30

## 2022-09-20 NOTE — NUR
Pt is stable on vent, Fio2 titrated to 40% during shift. Spo2 and respirations wnl. ETT 
repositioned Q2, ett/oral sxn prn. No resp. distress noted throughout shift. Will continue 
to monitor and follow current respiratory treatments as ordered.

## 2022-09-20 NOTE — NUR
Pt received intubated on CMV, 7.5 ETT secured with anchorfast @ 23cm lip line.  Fio2 
titrated to 90%. RN notified. Spo2 and respirations wnl. ETT repositioned. ETT/oral sxn.

## 2022-09-20 NOTE — NUR
Received pt sedated and intubated, on VENT rate 16, Tv 450, FIO2 40%, peep 5. O2 sat within 
desired limits. Cardiac wise, NSR, SBP within desired limits. Bledsoe catheter is on 
gravity.NGT clamped . 3 lumen Central line on right internal jugular,intact and patent. Will 
continue to monitor.

## 2022-09-21 VITALS — DIASTOLIC BLOOD PRESSURE: 63 MMHG | SYSTOLIC BLOOD PRESSURE: 108 MMHG

## 2022-09-21 VITALS — SYSTOLIC BLOOD PRESSURE: 121 MMHG | DIASTOLIC BLOOD PRESSURE: 59 MMHG

## 2022-09-21 VITALS — SYSTOLIC BLOOD PRESSURE: 100 MMHG | DIASTOLIC BLOOD PRESSURE: 58 MMHG

## 2022-09-21 VITALS — DIASTOLIC BLOOD PRESSURE: 68 MMHG | SYSTOLIC BLOOD PRESSURE: 127 MMHG

## 2022-09-21 VITALS — DIASTOLIC BLOOD PRESSURE: 62 MMHG | SYSTOLIC BLOOD PRESSURE: 113 MMHG

## 2022-09-21 VITALS — SYSTOLIC BLOOD PRESSURE: 117 MMHG | DIASTOLIC BLOOD PRESSURE: 65 MMHG

## 2022-09-21 VITALS — SYSTOLIC BLOOD PRESSURE: 119 MMHG | DIASTOLIC BLOOD PRESSURE: 62 MMHG

## 2022-09-21 VITALS — DIASTOLIC BLOOD PRESSURE: 66 MMHG | SYSTOLIC BLOOD PRESSURE: 118 MMHG

## 2022-09-21 VITALS — SYSTOLIC BLOOD PRESSURE: 93 MMHG | DIASTOLIC BLOOD PRESSURE: 53 MMHG

## 2022-09-21 VITALS — DIASTOLIC BLOOD PRESSURE: 50 MMHG | SYSTOLIC BLOOD PRESSURE: 99 MMHG

## 2022-09-21 VITALS — DIASTOLIC BLOOD PRESSURE: 51 MMHG | SYSTOLIC BLOOD PRESSURE: 97 MMHG

## 2022-09-21 VITALS — DIASTOLIC BLOOD PRESSURE: 52 MMHG | SYSTOLIC BLOOD PRESSURE: 91 MMHG

## 2022-09-21 VITALS — SYSTOLIC BLOOD PRESSURE: 100 MMHG | DIASTOLIC BLOOD PRESSURE: 50 MMHG

## 2022-09-21 VITALS — SYSTOLIC BLOOD PRESSURE: 99 MMHG | DIASTOLIC BLOOD PRESSURE: 61 MMHG

## 2022-09-21 VITALS — DIASTOLIC BLOOD PRESSURE: 64 MMHG | SYSTOLIC BLOOD PRESSURE: 115 MMHG

## 2022-09-21 VITALS — SYSTOLIC BLOOD PRESSURE: 106 MMHG | DIASTOLIC BLOOD PRESSURE: 54 MMHG

## 2022-09-21 VITALS — SYSTOLIC BLOOD PRESSURE: 115 MMHG | DIASTOLIC BLOOD PRESSURE: 69 MMHG

## 2022-09-21 VITALS — SYSTOLIC BLOOD PRESSURE: 97 MMHG | DIASTOLIC BLOOD PRESSURE: 52 MMHG

## 2022-09-21 VITALS — DIASTOLIC BLOOD PRESSURE: 65 MMHG | SYSTOLIC BLOOD PRESSURE: 117 MMHG

## 2022-09-21 VITALS — DIASTOLIC BLOOD PRESSURE: 62 MMHG | SYSTOLIC BLOOD PRESSURE: 86 MMHG

## 2022-09-21 VITALS — SYSTOLIC BLOOD PRESSURE: 112 MMHG | DIASTOLIC BLOOD PRESSURE: 67 MMHG

## 2022-09-21 VITALS — DIASTOLIC BLOOD PRESSURE: 66 MMHG | SYSTOLIC BLOOD PRESSURE: 133 MMHG

## 2022-09-21 VITALS — DIASTOLIC BLOOD PRESSURE: 71 MMHG | SYSTOLIC BLOOD PRESSURE: 130 MMHG

## 2022-09-21 VITALS — SYSTOLIC BLOOD PRESSURE: 87 MMHG | DIASTOLIC BLOOD PRESSURE: 51 MMHG

## 2022-09-21 VITALS — SYSTOLIC BLOOD PRESSURE: 106 MMHG | DIASTOLIC BLOOD PRESSURE: 61 MMHG

## 2022-09-21 VITALS — DIASTOLIC BLOOD PRESSURE: 68 MMHG | SYSTOLIC BLOOD PRESSURE: 109 MMHG

## 2022-09-21 VITALS — SYSTOLIC BLOOD PRESSURE: 109 MMHG | DIASTOLIC BLOOD PRESSURE: 67 MMHG

## 2022-09-21 VITALS — SYSTOLIC BLOOD PRESSURE: 116 MMHG | DIASTOLIC BLOOD PRESSURE: 61 MMHG

## 2022-09-21 VITALS — SYSTOLIC BLOOD PRESSURE: 118 MMHG | DIASTOLIC BLOOD PRESSURE: 66 MMHG

## 2022-09-21 VITALS — DIASTOLIC BLOOD PRESSURE: 63 MMHG | SYSTOLIC BLOOD PRESSURE: 119 MMHG

## 2022-09-21 VITALS — DIASTOLIC BLOOD PRESSURE: 63 MMHG | SYSTOLIC BLOOD PRESSURE: 112 MMHG

## 2022-09-21 VITALS — SYSTOLIC BLOOD PRESSURE: 122 MMHG | DIASTOLIC BLOOD PRESSURE: 65 MMHG

## 2022-09-21 VITALS — SYSTOLIC BLOOD PRESSURE: 115 MMHG | DIASTOLIC BLOOD PRESSURE: 64 MMHG

## 2022-09-21 VITALS — DIASTOLIC BLOOD PRESSURE: 64 MMHG | SYSTOLIC BLOOD PRESSURE: 118 MMHG

## 2022-09-21 VITALS — SYSTOLIC BLOOD PRESSURE: 91 MMHG | DIASTOLIC BLOOD PRESSURE: 49 MMHG

## 2022-09-21 VITALS — DIASTOLIC BLOOD PRESSURE: 70 MMHG | SYSTOLIC BLOOD PRESSURE: 109 MMHG

## 2022-09-21 VITALS — DIASTOLIC BLOOD PRESSURE: 58 MMHG | SYSTOLIC BLOOD PRESSURE: 114 MMHG

## 2022-09-21 VITALS — DIASTOLIC BLOOD PRESSURE: 59 MMHG | SYSTOLIC BLOOD PRESSURE: 107 MMHG

## 2022-09-21 VITALS — DIASTOLIC BLOOD PRESSURE: 58 MMHG | SYSTOLIC BLOOD PRESSURE: 99 MMHG

## 2022-09-21 VITALS — DIASTOLIC BLOOD PRESSURE: 58 MMHG | SYSTOLIC BLOOD PRESSURE: 108 MMHG

## 2022-09-21 VITALS — DIASTOLIC BLOOD PRESSURE: 59 MMHG | SYSTOLIC BLOOD PRESSURE: 100 MMHG

## 2022-09-21 VITALS — DIASTOLIC BLOOD PRESSURE: 65 MMHG | SYSTOLIC BLOOD PRESSURE: 113 MMHG

## 2022-09-21 VITALS — SYSTOLIC BLOOD PRESSURE: 111 MMHG | DIASTOLIC BLOOD PRESSURE: 59 MMHG

## 2022-09-21 VITALS — SYSTOLIC BLOOD PRESSURE: 113 MMHG | DIASTOLIC BLOOD PRESSURE: 62 MMHG

## 2022-09-21 VITALS — SYSTOLIC BLOOD PRESSURE: 100 MMHG | DIASTOLIC BLOOD PRESSURE: 54 MMHG

## 2022-09-21 VITALS — SYSTOLIC BLOOD PRESSURE: 113 MMHG | DIASTOLIC BLOOD PRESSURE: 68 MMHG

## 2022-09-21 VITALS — SYSTOLIC BLOOD PRESSURE: 109 MMHG | DIASTOLIC BLOOD PRESSURE: 70 MMHG

## 2022-09-21 VITALS — SYSTOLIC BLOOD PRESSURE: 116 MMHG | DIASTOLIC BLOOD PRESSURE: 64 MMHG

## 2022-09-21 VITALS — SYSTOLIC BLOOD PRESSURE: 96 MMHG | DIASTOLIC BLOOD PRESSURE: 52 MMHG

## 2022-09-21 VITALS — SYSTOLIC BLOOD PRESSURE: 124 MMHG | DIASTOLIC BLOOD PRESSURE: 65 MMHG

## 2022-09-21 VITALS — DIASTOLIC BLOOD PRESSURE: 60 MMHG | SYSTOLIC BLOOD PRESSURE: 108 MMHG

## 2022-09-21 VITALS — DIASTOLIC BLOOD PRESSURE: 64 MMHG | SYSTOLIC BLOOD PRESSURE: 144 MMHG

## 2022-09-21 LAB
BASE EXCESS BLDA CALC-SCNC: -5.8 MMOL/L
BUN SERPL-MCNC: 26 MG/DL (ref 7–18)
CHLORIDE SERPL-SCNC: 107 MMOL/L (ref 98–107)
CO2 SERPL-SCNC: 19 MMOL/L (ref 21–32)
CREAT SERPL-MCNC: 1.5 MG/DL (ref 0.6–1.3)
GLUCOSE SERPL-MCNC: 161 MG/DL (ref 74–106)
HCO3 BLDA-SCNC: 16 MMOL/L
HCT VFR BLD AUTO: 30.8 % (ref 36.7–47.1)
HGB BLDA OXIMETRY-MCNC: 11.3 G/DL (ref 13.5–18)
INHALED O2 CONCENTRATION: 40 %
INTRINSIC PEEP RESPIRATORY: 5 CMH20
MAGNESIUM SERPL-MCNC: 1.3 MG/DL (ref 1.8–2.4)
MCH RBC QN AUTO: 36.1 UUG (ref 23.8–33.4)
MCV RBC AUTO: 104 FL (ref 73–96.2)
PCO2 TEMP ADJ BLDA: 22 MMHG (ref 35–45)
PEEP SETTING VENT: 450 ML
PH TEMP ADJ BLDA: 7.48 [PH] (ref 7.35–7.45)
PHOSPHATE SERPL-MCNC: 3 MG/DL (ref 2.5–4.9)
PLATELET # BLD AUTO: 82 K/UL (ref 152–348)
PO2 TEMP ADJ BLDA: 78.7 MMHG (ref 75–100)
POTASSIUM SERPL-SCNC: 3.4 MMOL/L (ref 3.5–5.1)
SET RATE, BG: 16
SPECIMEN DRAWN FROM PATIENT: (no result)
VENTILATION MODE VENT: (no result)

## 2022-09-21 PROCEDURE — 0W993ZZ DRAINAGE OF RIGHT PLEURAL CAVITY, PERCUTANEOUS APPROACH: ICD-10-PCS | Performed by: RADIOLOGY

## 2022-09-21 RX ADMIN — LACTULOSE SCH G: 20 SOLUTION ORAL at 12:31

## 2022-09-21 RX ADMIN — ANORECTAL OINTMENT SCH GM: 15.7; .44; 24; 20.6 OINTMENT TOPICAL at 21:14

## 2022-09-21 RX ADMIN — LACTULOSE SCH G: 20 SOLUTION ORAL at 08:05

## 2022-09-21 RX ADMIN — PROPOFOL PRN MLS/HR: 10 INJECTION, EMULSION INTRAVENOUS at 20:10

## 2022-09-21 RX ADMIN — MEMANTINE HYDROCHLORIDE SCH MG: 10 TABLET ORAL at 21:14

## 2022-09-21 RX ADMIN — MAGNESIUM SULFATE IN DEXTROSE SCH MLS/HR: 10 INJECTION, SOLUTION INTRAVENOUS at 08:50

## 2022-09-21 RX ADMIN — PROPOFOL PRN MLS/HR: 10 INJECTION, EMULSION INTRAVENOUS at 06:41

## 2022-09-21 RX ADMIN — LACTULOSE SCH G: 20 SOLUTION ORAL at 17:34

## 2022-09-21 RX ADMIN — PIPERACILLIN SODIUM AND TAZOBACTAM SODIUM SCH MLS/HR: .375; 3 INJECTION, POWDER, LYOPHILIZED, FOR SOLUTION INTRAVENOUS at 07:40

## 2022-09-21 RX ADMIN — OXYBUTYNIN CHLORIDE SCH MG: 5 TABLET, EXTENDED RELEASE ORAL at 21:13

## 2022-09-21 RX ADMIN — PROPRANOLOL HYDROCHLORIDE SCH MG: 10 TABLET ORAL at 07:38

## 2022-09-21 RX ADMIN — FLUTICASONE FUROATE AND VILANTEROL TRIFENATATE SCH EACH: 100; 25 POWDER RESPIRATORY (INHALATION) at 07:37

## 2022-09-21 RX ADMIN — SODIUM CHLORIDE PRN UNIT: 9 INJECTION, SOLUTION INTRAVENOUS at 12:22

## 2022-09-21 RX ADMIN — TAMSULOSIN HYDROCHLORIDE SCH MG: 0.4 CAPSULE ORAL at 08:05

## 2022-09-21 RX ADMIN — Medication SCH EACH: at 12:16

## 2022-09-21 RX ADMIN — PIPERACILLIN SODIUM AND TAZOBACTAM SODIUM SCH MLS/HR: .375; 3 INJECTION, POWDER, LYOPHILIZED, FOR SOLUTION INTRAVENOUS at 17:39

## 2022-09-21 RX ADMIN — Medication SCH EACH: at 21:05

## 2022-09-21 RX ADMIN — POTASSIUM CHLORIDE SCH MLS/HR: 14.9 INJECTION, SOLUTION INTRAVENOUS at 08:50

## 2022-09-21 RX ADMIN — CLOTRIMAZOLE SCH GM: 1 CREAM TOPICAL at 17:39

## 2022-09-21 RX ADMIN — SODIUM CHLORIDE PRN UNIT: 9 INJECTION, SOLUTION INTRAVENOUS at 22:05

## 2022-09-21 RX ADMIN — LACTULOSE SCH G: 20 SOLUTION ORAL at 21:13

## 2022-09-21 RX ADMIN — Medication SCH EACH: at 16:30

## 2022-09-21 RX ADMIN — ATORVASTATIN CALCIUM SCH MG: 20 TABLET, FILM COATED ORAL at 21:13

## 2022-09-21 RX ADMIN — MAGNESIUM SULFATE IN DEXTROSE SCH MLS/HR: 10 INJECTION, SOLUTION INTRAVENOUS at 10:15

## 2022-09-21 RX ADMIN — Medication SCH EACH: at 07:34

## 2022-09-21 RX ADMIN — RIFAXIMIN SCH MG: 550 TABLET ORAL at 21:13

## 2022-09-21 RX ADMIN — RIFAXIMIN SCH MG: 550 TABLET ORAL at 08:05

## 2022-09-21 RX ADMIN — HEPARIN SODIUM SCH UNITS: 5000 INJECTION, SOLUTION INTRAVENOUS; SUBCUTANEOUS at 07:59

## 2022-09-21 RX ADMIN — POTASSIUM CHLORIDE SCH MLS/HR: 14.9 INJECTION, SOLUTION INTRAVENOUS at 10:15

## 2022-09-21 RX ADMIN — SODIUM CHLORIDE PRN UNIT: 9 INJECTION, SOLUTION INTRAVENOUS at 07:36

## 2022-09-21 RX ADMIN — PANTOPRAZOLE SODIUM SCH MG: 40 GRANULE, DELAYED RELEASE ORAL at 06:33

## 2022-09-21 RX ADMIN — PROPRANOLOL HYDROCHLORIDE SCH MG: 10 TABLET ORAL at 17:00

## 2022-09-21 NOTE — NUR
WOUND CARE CONSULT: PT PRESENTS WITH SACRAL INTACT DEEP TISSUE INJURY AND RASH TO BUTTOCKS, 
PRESENT ON ADMISSION. RECOMMENDATIONS MADE FOR SKIN PROTECTION AND WOUND CARE. DISCUSSED 
WITH NURSING STAFF. FIRST STEP LOW AIRLOSS MATTRESS IS ON ORDER. PT IS INCONTINENT OF LOOSE 
STOOL. MIKAYLA PAREDES NOTED. PT IS CURRENTLY INTUBATED. MD IN AGREEMENT WITH PLAN OF CARE. 

-------------------------------------------------------------------------------

Addendum: 09/21/22 at 1041 by KYLEIGH SNOW RN

-------------------------------------------------------------------------------

Amended: Links added.

## 2022-09-21 NOTE — NUR
Assumed care of pt from NELLIE Luo, per report pts day was uneventful short of a visit from 
wound care RN and a thoracentisis that was performed. pt found lying in bed, in high fowlers 
position, intubated and mechanically ventilated. Vent settings noted else where in chart. pt 
id being monitored via three lead cardiac monitor, automatic intermittent blood pressures 
and continuos pulse oximetry, pt has a sinus rhythm. Blood pressure, both systolic and MAP 
are within desired range and pts oxygenation is adequate pt has a doshi catheter that was 
assessed, cleaned and ensured it is properly anchored and collection bag is off the floor 
and below the bladder, pt is producing urine. pts skin is intact minus a sacral wound that 
was staged and treated earlier today by wound care. IV ABX  infusing per MD order. pt is 
within the line of sight of RN. VSS and SULLY RN will CTM

## 2022-09-21 NOTE — NUR
PATIENT ON CONT VILLAGRAN VENT WITH 7.5 ET/TUBE IN PLACE AND SECURED WITH ANCHOR FAST, MOVE 
POSITION Q2 HOURS, PT DOES ASSIST AT TIMES, PT IS SEDATED ON DIPRIVAN, SUCTION SOME BLOODY 
TINGE SECRETIONS, IN ORAL CAVITY WITH MALCOM, CHANGE HME, ALL VENT ALARMS GOOD, CURRENT 
VENT SETTINGS, A/C 16, 450ML, PEEP5 , FIO2 @ 40%, NO VENT CHANGES MADE, VENT PLUGGED INTO 
RED WALL OUTLET .TREV KEYSP

-------------------------------------------------------------------------------

Addendum: 09/21/22 at 0256 by DIANELYS KAY RT

-------------------------------------------------------------------------------

Amended: Links added.

## 2022-09-21 NOTE — NUR
Endorsed pt to next shift nurse, intubated and sedated. NSR and SBP within desired 
limits.Continue to monitor.

## 2022-09-21 NOTE — NUR
Pt has no change from previous assessment.Had 2 moderate loose stools. V/S within desired 
limits. Lactic Acid labs have been drawn Q4h, Levels continue to decrease .Continuing to 
monitor.

## 2022-09-22 VITALS — SYSTOLIC BLOOD PRESSURE: 160 MMHG | DIASTOLIC BLOOD PRESSURE: 62 MMHG

## 2022-09-22 VITALS — DIASTOLIC BLOOD PRESSURE: 71 MMHG | SYSTOLIC BLOOD PRESSURE: 119 MMHG

## 2022-09-22 VITALS — SYSTOLIC BLOOD PRESSURE: 101 MMHG | DIASTOLIC BLOOD PRESSURE: 52 MMHG

## 2022-09-22 VITALS — SYSTOLIC BLOOD PRESSURE: 124 MMHG | DIASTOLIC BLOOD PRESSURE: 67 MMHG

## 2022-09-22 VITALS — DIASTOLIC BLOOD PRESSURE: 66 MMHG | SYSTOLIC BLOOD PRESSURE: 111 MMHG

## 2022-09-22 VITALS — SYSTOLIC BLOOD PRESSURE: 121 MMHG | DIASTOLIC BLOOD PRESSURE: 62 MMHG

## 2022-09-22 VITALS — SYSTOLIC BLOOD PRESSURE: 108 MMHG | DIASTOLIC BLOOD PRESSURE: 62 MMHG

## 2022-09-22 VITALS — DIASTOLIC BLOOD PRESSURE: 64 MMHG | SYSTOLIC BLOOD PRESSURE: 116 MMHG

## 2022-09-22 VITALS — DIASTOLIC BLOOD PRESSURE: 69 MMHG | SYSTOLIC BLOOD PRESSURE: 134 MMHG

## 2022-09-22 VITALS — SYSTOLIC BLOOD PRESSURE: 115 MMHG | DIASTOLIC BLOOD PRESSURE: 63 MMHG

## 2022-09-22 VITALS — DIASTOLIC BLOOD PRESSURE: 54 MMHG | SYSTOLIC BLOOD PRESSURE: 149 MMHG

## 2022-09-22 VITALS — DIASTOLIC BLOOD PRESSURE: 67 MMHG | SYSTOLIC BLOOD PRESSURE: 123 MMHG

## 2022-09-22 VITALS — DIASTOLIC BLOOD PRESSURE: 69 MMHG | SYSTOLIC BLOOD PRESSURE: 116 MMHG

## 2022-09-22 VITALS — SYSTOLIC BLOOD PRESSURE: 157 MMHG | DIASTOLIC BLOOD PRESSURE: 69 MMHG

## 2022-09-22 VITALS — DIASTOLIC BLOOD PRESSURE: 66 MMHG | SYSTOLIC BLOOD PRESSURE: 131 MMHG

## 2022-09-22 VITALS — SYSTOLIC BLOOD PRESSURE: 137 MMHG | DIASTOLIC BLOOD PRESSURE: 63 MMHG

## 2022-09-22 VITALS — SYSTOLIC BLOOD PRESSURE: 139 MMHG | DIASTOLIC BLOOD PRESSURE: 67 MMHG

## 2022-09-22 VITALS — DIASTOLIC BLOOD PRESSURE: 72 MMHG | SYSTOLIC BLOOD PRESSURE: 116 MMHG

## 2022-09-22 VITALS — SYSTOLIC BLOOD PRESSURE: 140 MMHG | DIASTOLIC BLOOD PRESSURE: 70 MMHG

## 2022-09-22 VITALS — SYSTOLIC BLOOD PRESSURE: 120 MMHG | DIASTOLIC BLOOD PRESSURE: 61 MMHG

## 2022-09-22 VITALS — SYSTOLIC BLOOD PRESSURE: 122 MMHG | DIASTOLIC BLOOD PRESSURE: 63 MMHG

## 2022-09-22 VITALS — DIASTOLIC BLOOD PRESSURE: 74 MMHG | SYSTOLIC BLOOD PRESSURE: 116 MMHG

## 2022-09-22 VITALS — DIASTOLIC BLOOD PRESSURE: 69 MMHG | SYSTOLIC BLOOD PRESSURE: 127 MMHG

## 2022-09-22 VITALS — DIASTOLIC BLOOD PRESSURE: 57 MMHG | SYSTOLIC BLOOD PRESSURE: 100 MMHG

## 2022-09-22 VITALS — DIASTOLIC BLOOD PRESSURE: 70 MMHG | SYSTOLIC BLOOD PRESSURE: 124 MMHG

## 2022-09-22 VITALS — DIASTOLIC BLOOD PRESSURE: 63 MMHG | SYSTOLIC BLOOD PRESSURE: 146 MMHG

## 2022-09-22 VITALS — SYSTOLIC BLOOD PRESSURE: 112 MMHG | DIASTOLIC BLOOD PRESSURE: 65 MMHG

## 2022-09-22 VITALS — SYSTOLIC BLOOD PRESSURE: 143 MMHG | DIASTOLIC BLOOD PRESSURE: 63 MMHG

## 2022-09-22 VITALS — SYSTOLIC BLOOD PRESSURE: 107 MMHG | DIASTOLIC BLOOD PRESSURE: 57 MMHG

## 2022-09-22 VITALS — DIASTOLIC BLOOD PRESSURE: 69 MMHG | SYSTOLIC BLOOD PRESSURE: 141 MMHG

## 2022-09-22 LAB
BASE EXCESS BLDA CALC-SCNC: -2.2 MMOL/L
BILIRUB DIRECT SERPL-MCNC: 1.2 MG/DL (ref 0–0.2)
BILIRUB SERPL-MCNC: 2.5 MG/DL (ref 0.2–1)
BUN SERPL-MCNC: 21 MG/DL (ref 7–18)
CHLORIDE SERPL-SCNC: 109 MMOL/L (ref 98–107)
CO2 SERPL-SCNC: 22 MMOL/L (ref 21–32)
CREAT SERPL-MCNC: 1.2 MG/DL (ref 0.6–1.3)
EOSINOPHIL NFR BLD MANUAL: 5 % (ref 0–8)
GLUCOSE SERPL-MCNC: 128 MG/DL (ref 74–106)
HCO3 BLDA-SCNC: 19.2 MMOL/L
HCT VFR BLD AUTO: 29.7 % (ref 36.7–47.1)
HGB BLDA OXIMETRY-MCNC: 11.2 G/DL (ref 13.5–18)
INHALED O2 CONCENTRATION: 35 %
LYMPHOCYTES NFR BLD MANUAL: 9 % (ref 20–40)
MAGNESIUM SERPL-MCNC: 1.8 MG/DL (ref 1.8–2.4)
MCH RBC QN AUTO: 35.8 UUG (ref 23.8–33.4)
MCV RBC AUTO: 101.8 FL (ref 73–96.2)
MONOCYTES NFR BLD MANUAL: 6 % (ref 2–10)
NEUTS SEG NFR BLD MANUAL: 80 % (ref 42–75)
PCO2 TEMP ADJ BLDA: 23.5 MMHG (ref 35–45)
PH TEMP ADJ BLDA: 7.53 [PH] (ref 7.35–7.45)
PHOSPHATE SERPL-MCNC: 2 MG/DL (ref 2.5–4.9)
PLATELET # BLD AUTO: 70 K/UL (ref 152–348)
PO2 TEMP ADJ BLDA: 71.1 MMHG (ref 75–100)
POTASSIUM SERPL-SCNC: 3.3 MMOL/L (ref 3.5–5.1)
SPECIMEN DRAWN FROM PATIENT: (no result)
VENTILATION MODE VENT: (no result)

## 2022-09-22 RX ADMIN — SPIRONOLACTONE SCH MG: 50 TABLET, FILM COATED ORAL at 09:26

## 2022-09-22 RX ADMIN — ANORECTAL OINTMENT SCH APPLIC: 15.7; .44; 24; 20.6 OINTMENT TOPICAL at 21:44

## 2022-09-22 RX ADMIN — MEMANTINE HYDROCHLORIDE SCH MG: 10 TABLET ORAL at 21:43

## 2022-09-22 RX ADMIN — POTASSIUM CHLORIDE SCH MLS/HR: 14.9 INJECTION, SOLUTION INTRAVENOUS at 10:11

## 2022-09-22 RX ADMIN — RIFAXIMIN SCH MG: 550 TABLET ORAL at 21:43

## 2022-09-22 RX ADMIN — LACTULOSE SCH G: 20 SOLUTION ORAL at 09:41

## 2022-09-22 RX ADMIN — PROPRANOLOL HYDROCHLORIDE SCH MG: 10 TABLET ORAL at 09:29

## 2022-09-22 RX ADMIN — PROPRANOLOL HYDROCHLORIDE SCH MG: 10 TABLET ORAL at 17:26

## 2022-09-22 RX ADMIN — PANTOPRAZOLE SODIUM SCH MG: 40 GRANULE, DELAYED RELEASE ORAL at 07:00

## 2022-09-22 RX ADMIN — SODIUM CHLORIDE PRN UNIT: 9 INJECTION, SOLUTION INTRAVENOUS at 12:05

## 2022-09-22 RX ADMIN — SODIUM CHLORIDE PRN UNIT: 9 INJECTION, SOLUTION INTRAVENOUS at 22:22

## 2022-09-22 RX ADMIN — ANORECTAL OINTMENT SCH GM: 15.7; .44; 24; 20.6 OINTMENT TOPICAL at 09:00

## 2022-09-22 RX ADMIN — OXYBUTYNIN CHLORIDE SCH MG: 5 TABLET, EXTENDED RELEASE ORAL at 21:43

## 2022-09-22 RX ADMIN — PIPERACILLIN SODIUM AND TAZOBACTAM SODIUM SCH MLS/HR: .375; 3 INJECTION, POWDER, LYOPHILIZED, FOR SOLUTION INTRAVENOUS at 16:12

## 2022-09-22 RX ADMIN — LACTULOSE SCH G: 20 SOLUTION ORAL at 13:34

## 2022-09-22 RX ADMIN — POTASSIUM CHLORIDE SCH MLS/HR: 14.9 INJECTION, SOLUTION INTRAVENOUS at 10:00

## 2022-09-22 RX ADMIN — Medication SCH EACH: at 11:54

## 2022-09-22 RX ADMIN — ALBUMIN HUMAN SCH MLS/HR: 250 SOLUTION INTRAVENOUS at 22:39

## 2022-09-22 RX ADMIN — ATORVASTATIN CALCIUM SCH MG: 20 TABLET, FILM COATED ORAL at 21:43

## 2022-09-22 RX ADMIN — Medication SCH EACH: at 21:00

## 2022-09-22 RX ADMIN — LACTULOSE SCH G: 20 SOLUTION ORAL at 17:25

## 2022-09-22 RX ADMIN — Medication SCH EACH: at 16:44

## 2022-09-22 RX ADMIN — FLUTICASONE FUROATE AND VILANTEROL TRIFENATATE SCH EACH: 100; 25 POWDER RESPIRATORY (INHALATION) at 09:00

## 2022-09-22 RX ADMIN — RIFAXIMIN SCH MG: 550 TABLET ORAL at 09:42

## 2022-09-22 RX ADMIN — LACTULOSE SCH G: 20 SOLUTION ORAL at 21:43

## 2022-09-22 RX ADMIN — TAMSULOSIN HYDROCHLORIDE SCH MG: 0.4 CAPSULE ORAL at 09:41

## 2022-09-22 RX ADMIN — CLOTRIMAZOLE SCH GM: 1 CREAM TOPICAL at 17:26

## 2022-09-22 RX ADMIN — SODIUM CHLORIDE PRN UNIT: 9 INJECTION, SOLUTION INTRAVENOUS at 16:49

## 2022-09-22 RX ADMIN — PIPERACILLIN SODIUM AND TAZOBACTAM SODIUM SCH MLS/HR: .375; 3 INJECTION, POWDER, LYOPHILIZED, FOR SOLUTION INTRAVENOUS at 08:33

## 2022-09-22 RX ADMIN — CLOTRIMAZOLE SCH GM: 1 CREAM TOPICAL at 09:36

## 2022-09-22 RX ADMIN — ALBUMIN HUMAN SCH MLS/HR: 250 SOLUTION INTRAVENOUS at 16:50

## 2022-09-22 RX ADMIN — POTASSIUM CHLORIDE SCH MLS/HR: 14.9 INJECTION, SOLUTION INTRAVENOUS at 07:45

## 2022-09-22 RX ADMIN — Medication SCH EACH: at 07:30

## 2022-09-22 NOTE — NUR
called  DR. CANDELARIO FOR NOTEFY  UNABLE TO EXTUBATE PT DUE TO PT NOT FULLY AWAKE  O2 SAT 
97-98% ON C- PAP WITH FIO2  35%

## 2022-09-22 NOTE — NUR
no change from previous assessment, pt turned from left lateral to "floating" to right 
lateral Q2H, pt tolerated well, no stool noted. skin remain warm and blanchable, no new skin 
issues noted other than previously documented sacral wound. VSS and SULLY, RN will CTM

## 2022-09-22 NOTE — NUR
DR. ADELSON  CALL BACK  CONTINUE  AYALA- MAJO SANTAMARIA PT  WHEN AWAKE  UNTIL TOMMORRY  MORRNING 
AGG IN AM

## 2022-09-22 NOTE — NUR
RECEIVED PT FROM JOSE SKINNER RN PT INTUBATED ORALLAY BELLE MD AT  SIDE  [facility] RT  FOR WINING 
Interlude SCHMITT SITTING C -PAP  WITH FIO2 35%  DECREASSED  PROPOFOL DRIP TO 10MCG/KG/MIN

## 2022-09-22 NOTE — NUR
no significant changes in pts condition from previous assessment. pt turned q2h and 
tolerated wello, no excessive secrations and doshi in draining wnl. RT and internaist at 
bedside to extubate pt and place them on biPaP. report given and care endorsed to Critical 
Care RN, all questions answered, no acute distress noted

## 2022-09-22 NOTE — NUR
CALLED  DAUGH[facility] [employer]ANDIP [employer] [phone] NOTEFFY AWARE PT PLAN CARE TRANSFER TO [facility]

## 2022-09-23 VITALS — SYSTOLIC BLOOD PRESSURE: 126 MMHG | DIASTOLIC BLOOD PRESSURE: 67 MMHG

## 2022-09-23 VITALS — SYSTOLIC BLOOD PRESSURE: 102 MMHG | DIASTOLIC BLOOD PRESSURE: 67 MMHG

## 2022-09-23 VITALS — SYSTOLIC BLOOD PRESSURE: 117 MMHG | DIASTOLIC BLOOD PRESSURE: 67 MMHG

## 2022-09-23 VITALS — SYSTOLIC BLOOD PRESSURE: 119 MMHG | DIASTOLIC BLOOD PRESSURE: 54 MMHG

## 2022-09-23 VITALS — SYSTOLIC BLOOD PRESSURE: 122 MMHG | DIASTOLIC BLOOD PRESSURE: 62 MMHG

## 2022-09-23 VITALS — DIASTOLIC BLOOD PRESSURE: 68 MMHG | SYSTOLIC BLOOD PRESSURE: 123 MMHG

## 2022-09-23 VITALS — SYSTOLIC BLOOD PRESSURE: 119 MMHG | DIASTOLIC BLOOD PRESSURE: 72 MMHG

## 2022-09-23 VITALS — SYSTOLIC BLOOD PRESSURE: 121 MMHG | DIASTOLIC BLOOD PRESSURE: 67 MMHG

## 2022-09-23 VITALS — SYSTOLIC BLOOD PRESSURE: 117 MMHG | DIASTOLIC BLOOD PRESSURE: 73 MMHG

## 2022-09-23 VITALS — DIASTOLIC BLOOD PRESSURE: 68 MMHG | SYSTOLIC BLOOD PRESSURE: 129 MMHG

## 2022-09-23 VITALS — SYSTOLIC BLOOD PRESSURE: 127 MMHG | DIASTOLIC BLOOD PRESSURE: 84 MMHG

## 2022-09-23 VITALS — SYSTOLIC BLOOD PRESSURE: 106 MMHG | DIASTOLIC BLOOD PRESSURE: 56 MMHG

## 2022-09-23 VITALS — SYSTOLIC BLOOD PRESSURE: 120 MMHG | DIASTOLIC BLOOD PRESSURE: 67 MMHG

## 2022-09-23 VITALS — DIASTOLIC BLOOD PRESSURE: 69 MMHG | SYSTOLIC BLOOD PRESSURE: 128 MMHG

## 2022-09-23 VITALS — SYSTOLIC BLOOD PRESSURE: 118 MMHG | DIASTOLIC BLOOD PRESSURE: 59 MMHG

## 2022-09-23 VITALS — SYSTOLIC BLOOD PRESSURE: 115 MMHG | DIASTOLIC BLOOD PRESSURE: 68 MMHG

## 2022-09-23 VITALS — SYSTOLIC BLOOD PRESSURE: 113 MMHG | DIASTOLIC BLOOD PRESSURE: 32 MMHG

## 2022-09-23 VITALS — SYSTOLIC BLOOD PRESSURE: 106 MMHG | DIASTOLIC BLOOD PRESSURE: 58 MMHG

## 2022-09-23 VITALS — SYSTOLIC BLOOD PRESSURE: 128 MMHG | DIASTOLIC BLOOD PRESSURE: 67 MMHG

## 2022-09-23 VITALS — DIASTOLIC BLOOD PRESSURE: 72 MMHG | SYSTOLIC BLOOD PRESSURE: 131 MMHG

## 2022-09-23 VITALS — DIASTOLIC BLOOD PRESSURE: 68 MMHG | SYSTOLIC BLOOD PRESSURE: 127 MMHG

## 2022-09-23 LAB
BASE EXCESS BLDA CALC-SCNC: -3.8 MMOL/L
BIPAP ANDOR CPAP SETTING VENT: 8 CMH20
BUN SERPL-MCNC: 16 MG/DL (ref 7–18)
CHLORIDE SERPL-SCNC: 107 MMOL/L (ref 98–107)
CO2 SERPL-SCNC: 22 MMOL/L (ref 21–32)
CREAT SERPL-MCNC: 1.1 MG/DL (ref 0.6–1.3)
EOSINOPHIL NFR BLD MANUAL: 2 % (ref 0–8)
GLUCOSE SERPL-MCNC: 143 MG/DL (ref 74–106)
HCO3 BLDA-SCNC: 17.9 MMOL/L
HCT VFR BLD AUTO: 27.9 % (ref 36.7–47.1)
HGB BLDA OXIMETRY-MCNC: 10.8 G/DL (ref 13.5–18)
INHALED O2 CONCENTRATION: 35 %
LDH SERPL L TO P-CCNC: 198 U/L (ref 85–227)
LYMPHOCYTES NFR BLD MANUAL: 7 % (ref 20–40)
MAGNESIUM SERPL-MCNC: 1.4 MG/DL (ref 1.8–2.4)
MCH RBC QN AUTO: 36.2 UUG (ref 23.8–33.4)
MCV RBC AUTO: 101.9 FL (ref 73–96.2)
MONOCYTES NFR BLD MANUAL: 8 % (ref 2–10)
NEUTS SEG NFR BLD MANUAL: 83 % (ref 42–75)
PCO2 TEMP ADJ BLDA: 23.1 MMHG (ref 35–45)
PH TEMP ADJ BLDA: 7.51 [PH] (ref 7.35–7.45)
PHOSPHATE SERPL-MCNC: 1.9 MG/DL (ref 2.5–4.9)
PLATELET # BLD AUTO: 71 K/UL (ref 152–348)
PO2 TEMP ADJ BLDA: 79.6 MMHG (ref 75–100)
POTASSIUM SERPL-SCNC: 3.3 MMOL/L (ref 3.5–5.1)
SPECIMEN DRAWN FROM PATIENT: (no result)

## 2022-09-23 RX ADMIN — LACTULOSE SCH G: 20 SOLUTION ORAL at 08:12

## 2022-09-23 RX ADMIN — MEMANTINE HYDROCHLORIDE SCH MG: 10 TABLET ORAL at 20:29

## 2022-09-23 RX ADMIN — Medication SCH EACH: at 20:34

## 2022-09-23 RX ADMIN — PIPERACILLIN SODIUM AND TAZOBACTAM SODIUM SCH MLS/HR: .375; 3 INJECTION, POWDER, LYOPHILIZED, FOR SOLUTION INTRAVENOUS at 08:18

## 2022-09-23 RX ADMIN — ANORECTAL OINTMENT SCH APPLIC: 15.7; .44; 24; 20.6 OINTMENT TOPICAL at 08:14

## 2022-09-23 RX ADMIN — OXYBUTYNIN CHLORIDE SCH MG: 5 TABLET, EXTENDED RELEASE ORAL at 20:27

## 2022-09-23 RX ADMIN — ATORVASTATIN CALCIUM SCH MG: 20 TABLET, FILM COATED ORAL at 20:29

## 2022-09-23 RX ADMIN — CLOTRIMAZOLE SCH GM: 1 CREAM TOPICAL at 16:29

## 2022-09-23 RX ADMIN — POTASSIUM CHLORIDE SCH MLS/HR: 14.9 INJECTION, SOLUTION INTRAVENOUS at 10:18

## 2022-09-23 RX ADMIN — MAGNESIUM SULFATE IN DEXTROSE SCH MLS/HR: 10 INJECTION, SOLUTION INTRAVENOUS at 08:12

## 2022-09-23 RX ADMIN — SODIUM CHLORIDE PRN UNIT: 9 INJECTION, SOLUTION INTRAVENOUS at 11:53

## 2022-09-23 RX ADMIN — PANTOPRAZOLE SODIUM SCH MG: 40 GRANULE, DELAYED RELEASE ORAL at 06:30

## 2022-09-23 RX ADMIN — SPIRONOLACTONE SCH MG: 50 TABLET, FILM COATED ORAL at 08:13

## 2022-09-23 RX ADMIN — LACTULOSE SCH G: 20 SOLUTION ORAL at 13:24

## 2022-09-23 RX ADMIN — SODIUM CHLORIDE PRN UNIT: 9 INJECTION, SOLUTION INTRAVENOUS at 16:38

## 2022-09-23 RX ADMIN — FUROSEMIDE SCH MG: 40 TABLET ORAL at 08:16

## 2022-09-23 RX ADMIN — MAGNESIUM SULFATE IN DEXTROSE SCH MLS/HR: 10 INJECTION, SOLUTION INTRAVENOUS at 10:07

## 2022-09-23 RX ADMIN — PROPRANOLOL HYDROCHLORIDE SCH MG: 10 TABLET ORAL at 08:13

## 2022-09-23 RX ADMIN — MAGNESIUM SULFATE IN DEXTROSE SCH MLS/HR: 10 INJECTION, SOLUTION INTRAVENOUS at 11:33

## 2022-09-23 RX ADMIN — RIFAXIMIN SCH MG: 550 TABLET ORAL at 08:13

## 2022-09-23 RX ADMIN — Medication SCH EACH: at 11:52

## 2022-09-23 RX ADMIN — SODIUM CHLORIDE SCH MLS/HR: 9 INJECTION, SOLUTION INTRAVENOUS at 17:03

## 2022-09-23 RX ADMIN — PIPERACILLIN SODIUM AND TAZOBACTAM SODIUM SCH MLS/HR: .375; 3 INJECTION, POWDER, LYOPHILIZED, FOR SOLUTION INTRAVENOUS at 00:21

## 2022-09-23 RX ADMIN — Medication SCH EACH: at 07:35

## 2022-09-23 RX ADMIN — POTASSIUM CHLORIDE SCH MLS/HR: 14.9 INJECTION, SOLUTION INTRAVENOUS at 11:39

## 2022-09-23 RX ADMIN — LACTULOSE SCH G: 20 SOLUTION ORAL at 20:27

## 2022-09-23 RX ADMIN — MAGNESIUM SULFATE IN DEXTROSE SCH MLS/HR: 10 INJECTION, SOLUTION INTRAVENOUS at 09:17

## 2022-09-23 RX ADMIN — CLOTRIMAZOLE SCH GM: 1 CREAM TOPICAL at 08:14

## 2022-09-23 RX ADMIN — SODIUM CHLORIDE PRN UNIT: 9 INJECTION, SOLUTION INTRAVENOUS at 09:01

## 2022-09-23 RX ADMIN — FLUTICASONE FUROATE AND VILANTEROL TRIFENATATE SCH EACH: 100; 25 POWDER RESPIRATORY (INHALATION) at 08:19

## 2022-09-23 RX ADMIN — SODIUM CHLORIDE SCH MLS/HR: 9 INJECTION, SOLUTION INTRAVENOUS at 10:14

## 2022-09-23 RX ADMIN — Medication SCH EACH: at 16:21

## 2022-09-23 RX ADMIN — PROPRANOLOL HYDROCHLORIDE SCH MG: 10 TABLET ORAL at 16:28

## 2022-09-23 RX ADMIN — ANORECTAL OINTMENT SCH APPLIC: 15.7; .44; 24; 20.6 OINTMENT TOPICAL at 20:29

## 2022-09-23 RX ADMIN — TAMSULOSIN HYDROCHLORIDE SCH MG: 0.4 CAPSULE ORAL at 20:29

## 2022-09-23 RX ADMIN — SODIUM CHLORIDE PRN UNIT: 9 INJECTION, SOLUTION INTRAVENOUS at 20:33

## 2022-09-23 RX ADMIN — RIFAXIMIN SCH MG: 550 TABLET ORAL at 20:29

## 2022-09-23 RX ADMIN — LACTULOSE SCH G: 20 SOLUTION ORAL at 16:24

## 2022-09-23 NOTE — NUR
Noted with minimal-moderate blood upon suctioning around the mouth and throat. RT at 
bedside. Wheezing noted, breathing treatment provided by RT as ordered. Will continue to 
monitor.

## 2022-09-23 NOTE — NUR
Noted with 2x watery BM during the shift. Bed bath provided, tolerated well. suctioned as 
needed, Turned and repostioned q2 for perfusion. Patient tolerated care. Responding to 
voice/touch, opens eyes. Still noted with general weakness. will endorse.

## 2022-09-23 NOTE — NUR
PT EXTUBATED AT THIS TIME PER MD ORDER. NO S/S OF RESPIRATORY DISTRESS NOTED. PLACED ON 3LPM 
VIA NASAL CANNULA. MD ORDER TO KEEP SPO2 >92%. PT MORE RESPONSIVE AT THIS TIME - ATTEMPTING 
TO GRAB MY ARMS DURING EXTUBATION. EYES MINIMALLY OPEN DURING PROCEDURE. WILL CONTINUE TO 
MONITOR.

## 2022-09-23 NOTE — NUR
RT extubated patient at 1000H per MD order. No respiratory distress identified. Tolerated 
the procedure well. On 3L via NC, saturating at 94%. /54mmHg, RR 20, HR 67.

## 2022-09-23 NOTE — NUR
Received patient on CPAP PS 8, FI02 35%. Orally intubated 7.5 ETT, 23 LL. No distress noted. 
Unable to follow commands. Arousable to touch. Central line intact, infusing well. 
Peripheral IV to RAC, and LFA, patent. Will continue to monitor.

## 2022-09-23 NOTE — NUR
Moves r upper extremity, kept bringing it up near mouth . Grabs but doesn't pull. soft wrist 
restraints to keep R hand from grabbing ETT.

## 2022-09-23 NOTE — NUR
more awake this morning, eyes open spontaneously, especially when stimulated but he does not 
follow commsnds.

## 2022-09-23 NOTE — NUR
PT RECEIVED ORALLY INTUBATED WITH 7.5 ETT, APPROX. 23 CM AT THE LIP. PT IT TOLERATING CPAP 
PS 8, FIO2 35% WELL. NO RESPIRATORY DISTRESS NOTED. PT IS UNABLE TO FOLLOW COMMANDS AT THIS 
TIME. SLIGHT GRIMACE UPON TOUCH. EYES MINIMALLY OPEN. SUCTION SMALL AMOUNT OF BROWN TINGE 
SECRETIONS. AMBU BAG AT BEDSIDE. VENT PLUGGED INTO RED EMERGENCY OUTLET. WILL CONTINUE TO 
MONITOR.

## 2022-09-24 VITALS — DIASTOLIC BLOOD PRESSURE: 61 MMHG | SYSTOLIC BLOOD PRESSURE: 124 MMHG

## 2022-09-24 VITALS — DIASTOLIC BLOOD PRESSURE: 50 MMHG | SYSTOLIC BLOOD PRESSURE: 91 MMHG

## 2022-09-24 VITALS — SYSTOLIC BLOOD PRESSURE: 98 MMHG | DIASTOLIC BLOOD PRESSURE: 52 MMHG

## 2022-09-24 VITALS — DIASTOLIC BLOOD PRESSURE: 49 MMHG | SYSTOLIC BLOOD PRESSURE: 105 MMHG

## 2022-09-24 VITALS — SYSTOLIC BLOOD PRESSURE: 120 MMHG | DIASTOLIC BLOOD PRESSURE: 61 MMHG

## 2022-09-24 VITALS — DIASTOLIC BLOOD PRESSURE: 61 MMHG | SYSTOLIC BLOOD PRESSURE: 142 MMHG

## 2022-09-24 VITALS — SYSTOLIC BLOOD PRESSURE: 137 MMHG | DIASTOLIC BLOOD PRESSURE: 71 MMHG

## 2022-09-24 VITALS — SYSTOLIC BLOOD PRESSURE: 112 MMHG | DIASTOLIC BLOOD PRESSURE: 55 MMHG

## 2022-09-24 VITALS — DIASTOLIC BLOOD PRESSURE: 66 MMHG | SYSTOLIC BLOOD PRESSURE: 110 MMHG

## 2022-09-24 VITALS — SYSTOLIC BLOOD PRESSURE: 112 MMHG | DIASTOLIC BLOOD PRESSURE: 52 MMHG

## 2022-09-24 LAB
BASE EXCESS BLDA CALC-SCNC: -3.4 MMOL/L
BILIRUB DIRECT SERPL-MCNC: 1.3 MG/DL (ref 0–0.2)
BILIRUB SERPL-MCNC: 4 MG/DL (ref 0.2–1)
BUN SERPL-MCNC: 14 MG/DL (ref 7–18)
CHLORIDE SERPL-SCNC: 109 MMOL/L (ref 98–107)
CO2 SERPL-SCNC: 21 MMOL/L (ref 21–32)
CREAT SERPL-MCNC: 0.9 MG/DL (ref 0.6–1.3)
GLUCOSE SERPL-MCNC: 133 MG/DL (ref 74–106)
HCO3 BLDA-SCNC: 18.8 MMOL/L
HCT VFR BLD AUTO: 29.2 % (ref 36.7–47.1)
HGB BLDA OXIMETRY-MCNC: 11 G/DL (ref 13.5–18)
INHALED O2 CONCENTRATION: 32 %
INHALED O2 FLOW RATE: 3 L/MIN (ref 0–30)
LYMPHOCYTES NFR BLD MANUAL: 15 % (ref 20–40)
MAGNESIUM SERPL-MCNC: 1.9 MG/DL (ref 1.8–2.4)
MCH RBC QN AUTO: 35.6 UUG (ref 23.8–33.4)
MCV RBC AUTO: 102.7 FL (ref 73–96.2)
MONOCYTES NFR BLD MANUAL: 16 % (ref 2–10)
NEUTS SEG NFR BLD MANUAL: 69 % (ref 42–75)
PCO2 TEMP ADJ BLDA: 25.8 MMHG (ref 35–45)
PH TEMP ADJ BLDA: 7.48 [PH] (ref 7.35–7.45)
PHOSPHATE SERPL-MCNC: 2 MG/DL (ref 2.5–4.9)
PLATELET # BLD AUTO: 66 K/UL (ref 152–348)
PO2 TEMP ADJ BLDA: 66.2 MMHG (ref 75–100)
POTASSIUM SERPL-SCNC: 3.6 MMOL/L (ref 3.5–5.1)
SPECIMEN DRAWN FROM PATIENT: (no result)

## 2022-09-24 RX ADMIN — PANTOPRAZOLE SODIUM SCH MG: 40 GRANULE, DELAYED RELEASE ORAL at 06:31

## 2022-09-24 RX ADMIN — LACTULOSE SCH G: 20 SOLUTION ORAL at 08:51

## 2022-09-24 RX ADMIN — FLUTICASONE FUROATE AND VILANTEROL TRIFENATATE SCH EACH: 100; 25 POWDER RESPIRATORY (INHALATION) at 08:51

## 2022-09-24 RX ADMIN — FUROSEMIDE SCH MG: 40 TABLET ORAL at 08:52

## 2022-09-24 RX ADMIN — Medication SCH EACH: at 21:28

## 2022-09-24 RX ADMIN — PROPRANOLOL HYDROCHLORIDE SCH MG: 10 TABLET ORAL at 19:00

## 2022-09-24 RX ADMIN — SPIRONOLACTONE SCH MG: 50 TABLET, FILM COATED ORAL at 08:51

## 2022-09-24 RX ADMIN — LACTULOSE SCH G: 20 SOLUTION ORAL at 13:00

## 2022-09-24 RX ADMIN — LACTULOSE SCH G: 20 SOLUTION ORAL at 13:52

## 2022-09-24 RX ADMIN — Medication SCH EACH: at 06:36

## 2022-09-24 RX ADMIN — PROPRANOLOL HYDROCHLORIDE SCH MG: 10 TABLET ORAL at 08:52

## 2022-09-24 RX ADMIN — SODIUM CHLORIDE SCH MLS/HR: 9 INJECTION, SOLUTION INTRAVENOUS at 10:23

## 2022-09-24 RX ADMIN — ANORECTAL OINTMENT SCH APPLIC: 15.7; .44; 24; 20.6 OINTMENT TOPICAL at 08:52

## 2022-09-24 RX ADMIN — LACTULOSE SCH G: 20 SOLUTION ORAL at 19:00

## 2022-09-24 RX ADMIN — SODIUM CHLORIDE PRN UNIT: 9 INJECTION, SOLUTION INTRAVENOUS at 21:29

## 2022-09-24 RX ADMIN — ANORECTAL OINTMENT SCH APPLIC: 15.7; .44; 24; 20.6 OINTMENT TOPICAL at 21:33

## 2022-09-24 RX ADMIN — SODIUM CHLORIDE SCH MLS/HR: 9 INJECTION, SOLUTION INTRAVENOUS at 01:35

## 2022-09-24 RX ADMIN — RIFAXIMIN SCH MG: 550 TABLET ORAL at 21:00

## 2022-09-24 RX ADMIN — SODIUM CHLORIDE SCH MLS/HR: 9 INJECTION, SOLUTION INTRAVENOUS at 18:33

## 2022-09-24 RX ADMIN — SODIUM CHLORIDE PRN UNIT: 9 INJECTION, SOLUTION INTRAVENOUS at 12:27

## 2022-09-24 RX ADMIN — OXYBUTYNIN CHLORIDE SCH MG: 5 TABLET, EXTENDED RELEASE ORAL at 21:00

## 2022-09-24 RX ADMIN — CLOTRIMAZOLE SCH GM: 1 CREAM TOPICAL at 08:52

## 2022-09-24 RX ADMIN — TAMSULOSIN HYDROCHLORIDE SCH MG: 0.4 CAPSULE ORAL at 21:00

## 2022-09-24 RX ADMIN — LACTULOSE SCH G: 20 SOLUTION ORAL at 21:00

## 2022-09-24 RX ADMIN — RIFAXIMIN SCH MG: 550 TABLET ORAL at 08:52

## 2022-09-24 RX ADMIN — ATORVASTATIN CALCIUM SCH MG: 20 TABLET, FILM COATED ORAL at 21:00

## 2022-09-24 RX ADMIN — Medication SCH EACH: at 12:15

## 2022-09-24 RX ADMIN — MEMANTINE HYDROCHLORIDE SCH MG: 10 TABLET ORAL at 21:00

## 2022-09-24 RX ADMIN — Medication SCH EACH: at 17:09

## 2022-09-24 RX ADMIN — CLOTRIMAZOLE SCH GM: 1 CREAM TOPICAL at 17:59

## 2022-09-24 NOTE — NUR
ST was ordered yesterday for swallow evaluation today by Dr. Rivera. Will endorse to follow 
up with the ST.

## 2022-09-24 NOTE — NUR
patient remained stable during the night. No distress identified. note with 4xBM durnig the 
shift. Per Dr Long NP, patient will remain ICU status for 24hrs s/p extubation. Turned 
and repositioned for perfusion. on 3L via NC, saturation maintaining above 92%. will 
endorse.

## 2022-09-24 NOTE — NUR
Received pt. AAOX1. On NC 3Liters saturation above 95%. no respiratory distress. On cardiac 
monitoring and on sinus rhythm sbp within normal. doshi to gravity, NG noted to clamped. 
Will continue to monitor.

## 2022-09-24 NOTE — NUR
Telephone report given to PRAVIN Motta. pending interventions endorsed. Patient 
taken up to room 325 by nursing supervisor. PT. AAOX2. vitals stable 118/79, hr 
of 97. saturation of 97%. on RA.

## 2022-09-24 NOTE — NUR
Received pt from ER. Pt was seen by speech therapist. Patient wasn't administered PO meds 
due to his NPO status. Patient is currently sleeping. No pain, no distress noticed. Will 
continue to monitor.

## 2022-09-24 NOTE — NUR
Patient was brought from ER. Got report from ER nurse Evelyn. Had wrist restraints on, I 
took them off since the patient was calm and didn't have a reason to have them here. Patient 
is on room air, saturating 96. Patient is sleeping.

## 2022-09-25 VITALS — SYSTOLIC BLOOD PRESSURE: 117 MMHG | DIASTOLIC BLOOD PRESSURE: 68 MMHG

## 2022-09-25 VITALS — DIASTOLIC BLOOD PRESSURE: 57 MMHG | SYSTOLIC BLOOD PRESSURE: 125 MMHG

## 2022-09-25 VITALS — SYSTOLIC BLOOD PRESSURE: 144 MMHG | DIASTOLIC BLOOD PRESSURE: 65 MMHG

## 2022-09-25 VITALS — SYSTOLIC BLOOD PRESSURE: 135 MMHG | DIASTOLIC BLOOD PRESSURE: 67 MMHG

## 2022-09-25 VITALS — SYSTOLIC BLOOD PRESSURE: 135 MMHG | DIASTOLIC BLOOD PRESSURE: 65 MMHG

## 2022-09-25 LAB
ALP SERPL-CCNC: 104 U/L (ref 50–136)
ALT SERPL W/O P-5'-P-CCNC: 23 U/L (ref 16–63)
AST SERPL-CCNC: 28 U/L (ref 15–37)
BILIRUB DIRECT SERPL-MCNC: 1.1 MG/DL (ref 0–0.2)
BILIRUB SERPL-MCNC: 3.4 MG/DL (ref 0.2–1)
BUN SERPL-MCNC: 18 MG/DL (ref 7–18)
CHLORIDE SERPL-SCNC: 110 MMOL/L (ref 98–107)
CO2 SERPL-SCNC: 20 MMOL/L (ref 21–32)
CREAT SERPL-MCNC: 0.8 MG/DL (ref 0.6–1.3)
EOSINOPHIL NFR BLD MANUAL: 6 % (ref 0–8)
GLUCOSE SERPL-MCNC: 135 MG/DL (ref 74–106)
HCT VFR BLD AUTO: 33.4 % (ref 36.7–47.1)
LYMPHOCYTES NFR BLD MANUAL: 16 % (ref 20–40)
MAGNESIUM SERPL-MCNC: 1.8 MG/DL (ref 1.8–2.4)
MCH RBC QN AUTO: 34.8 UUG (ref 23.8–33.4)
MCV RBC AUTO: 102.5 FL (ref 73–96.2)
MONOCYTES NFR BLD MANUAL: 8 % (ref 2–10)
NEUTS BAND NFR BLD MANUAL: 4 % (ref 0–10)
NEUTS SEG NFR BLD MANUAL: 66 % (ref 42–75)
PHOSPHATE SERPL-MCNC: 1.8 MG/DL (ref 2.5–4.9)
PLATELET # BLD AUTO: 75 K/UL (ref 152–348)
POTASSIUM SERPL-SCNC: 3.7 MMOL/L (ref 3.5–5.1)
WS STN SPEC: 6.8 G/DL (ref 6.4–8.2)

## 2022-09-25 RX ADMIN — LACTULOSE SCH G: 20 SOLUTION ORAL at 12:32

## 2022-09-25 RX ADMIN — ATORVASTATIN CALCIUM SCH MG: 20 TABLET, FILM COATED ORAL at 21:00

## 2022-09-25 RX ADMIN — SODIUM CHLORIDE SCH MLS/HR: 9 INJECTION, SOLUTION INTRAVENOUS at 02:00

## 2022-09-25 RX ADMIN — CLOTRIMAZOLE SCH GM: 1 CREAM TOPICAL at 10:37

## 2022-09-25 RX ADMIN — ANORECTAL OINTMENT SCH APPLIC: 15.7; .44; 24; 20.6 OINTMENT TOPICAL at 21:20

## 2022-09-25 RX ADMIN — ANORECTAL OINTMENT SCH APPLIC: 15.7; .44; 24; 20.6 OINTMENT TOPICAL at 10:36

## 2022-09-25 RX ADMIN — FLUTICASONE FUROATE AND VILANTEROL TRIFENATATE SCH EACH: 100; 25 POWDER RESPIRATORY (INHALATION) at 09:00

## 2022-09-25 RX ADMIN — Medication SCH EACH: at 21:18

## 2022-09-25 RX ADMIN — SODIUM CHLORIDE SCH MLS/HR: 9 INJECTION, SOLUTION INTRAVENOUS at 10:36

## 2022-09-25 RX ADMIN — PROPRANOLOL HYDROCHLORIDE SCH MG: 10 TABLET ORAL at 09:00

## 2022-09-25 RX ADMIN — LACTULOSE SCH G: 20 SOLUTION ORAL at 21:00

## 2022-09-25 RX ADMIN — MEMANTINE HYDROCHLORIDE SCH MG: 10 TABLET ORAL at 21:00

## 2022-09-25 RX ADMIN — LACTULOSE SCH G: 20 SOLUTION ORAL at 09:00

## 2022-09-25 RX ADMIN — PANTOPRAZOLE SODIUM SCH MG: 40 GRANULE, DELAYED RELEASE ORAL at 06:35

## 2022-09-25 RX ADMIN — RIFAXIMIN SCH MG: 550 TABLET ORAL at 21:00

## 2022-09-25 RX ADMIN — Medication SCH EACH: at 11:23

## 2022-09-25 RX ADMIN — PROPRANOLOL HYDROCHLORIDE SCH MG: 10 TABLET ORAL at 16:43

## 2022-09-25 RX ADMIN — OXYBUTYNIN CHLORIDE SCH MG: 5 TABLET, EXTENDED RELEASE ORAL at 21:00

## 2022-09-25 RX ADMIN — Medication SCH EACH: at 16:42

## 2022-09-25 RX ADMIN — TAMSULOSIN HYDROCHLORIDE SCH MG: 0.4 CAPSULE ORAL at 21:00

## 2022-09-25 RX ADMIN — SPIRONOLACTONE SCH MG: 50 TABLET, FILM COATED ORAL at 09:00

## 2022-09-25 RX ADMIN — Medication SCH EACH: at 06:33

## 2022-09-25 RX ADMIN — LACTULOSE SCH G: 20 SOLUTION ORAL at 16:43

## 2022-09-25 RX ADMIN — WHITE PETROLATUM PRN GM: 1 OINTMENT TOPICAL at 10:37

## 2022-09-25 RX ADMIN — RIFAXIMIN SCH MG: 550 TABLET ORAL at 09:00

## 2022-09-25 RX ADMIN — SODIUM CHLORIDE SCH MLS/HR: 9 INJECTION, SOLUTION INTRAVENOUS at 21:13

## 2022-09-25 RX ADMIN — CLOTRIMAZOLE SCH GM: 1 CREAM TOPICAL at 16:43

## 2022-09-25 RX ADMIN — FUROSEMIDE SCH MG: 40 TABLET ORAL at 09:00

## 2022-09-25 NOTE — NUR
Remains npo due to failed swallow eval yesterday. ST is not here today for repeat swallow 
eval and NP Sulema is aware. Patient sleeps on/off, speaks farsi. No ss of pain or resp 
distress. Fc intact draining herminia-colored urine. No hematuria. Remains on restraints for 
pulling lines/tubes, reoriented prn. Kept comfortable. Safety ensured. Needs attended.

## 2022-09-25 NOTE — NUR
Patient is on NPO, for repeat swallow eval this morning. Oral meds not given. Alert to self, 
farsi speaking. No acute distress noted, no facial grimacing. Right IJ with triple lumen 
intact. Needs assessed and attended to. Call light within easy reach.

## 2022-09-26 VITALS — DIASTOLIC BLOOD PRESSURE: 72 MMHG | SYSTOLIC BLOOD PRESSURE: 135 MMHG

## 2022-09-26 VITALS — DIASTOLIC BLOOD PRESSURE: 62 MMHG | SYSTOLIC BLOOD PRESSURE: 141 MMHG

## 2022-09-26 VITALS — DIASTOLIC BLOOD PRESSURE: 55 MMHG | SYSTOLIC BLOOD PRESSURE: 121 MMHG

## 2022-09-26 VITALS — SYSTOLIC BLOOD PRESSURE: 119 MMHG | DIASTOLIC BLOOD PRESSURE: 57 MMHG

## 2022-09-26 LAB
BUN SERPL-MCNC: 19 MG/DL (ref 7–18)
CHLORIDE SERPL-SCNC: 113 MMOL/L (ref 98–107)
CO2 SERPL-SCNC: 22 MMOL/L (ref 21–32)
CREAT SERPL-MCNC: 0.8 MG/DL (ref 0.6–1.3)
EOSINOPHIL NFR BLD MANUAL: 15 % (ref 0–8)
GLUCOSE SERPL-MCNC: 101 MG/DL (ref 74–106)
HCT VFR BLD AUTO: 33.5 % (ref 36.7–47.1)
LYMPHOCYTES NFR BLD MANUAL: 17 % (ref 20–40)
MAGNESIUM SERPL-MCNC: 1.9 MG/DL (ref 1.8–2.4)
MCH RBC QN AUTO: 35.4 UUG (ref 23.8–33.4)
MCV RBC AUTO: 102.7 FL (ref 73–96.2)
MONOCYTES NFR BLD MANUAL: 8 % (ref 2–10)
NEUTS BAND NFR BLD MANUAL: 2 % (ref 0–10)
NEUTS SEG NFR BLD MANUAL: 58 % (ref 42–75)
PHOSPHATE SERPL-MCNC: 3.1 MG/DL (ref 2.5–4.9)
PLATELET # BLD AUTO: 82 K/UL (ref 152–348)
POTASSIUM SERPL-SCNC: 3.7 MMOL/L (ref 3.5–5.1)

## 2022-09-26 RX ADMIN — Medication SCH EACH: at 06:59

## 2022-09-26 RX ADMIN — Medication SCH EACH: at 21:37

## 2022-09-26 RX ADMIN — SODIUM CHLORIDE SCH MLS/HR: 9 INJECTION, SOLUTION INTRAVENOUS at 10:01

## 2022-09-26 RX ADMIN — FLUTICASONE FUROATE AND VILANTEROL TRIFENATATE SCH EACH: 100; 25 POWDER RESPIRATORY (INHALATION) at 09:00

## 2022-09-26 RX ADMIN — ANORECTAL OINTMENT SCH APPLIC: 15.7; .44; 24; 20.6 OINTMENT TOPICAL at 21:24

## 2022-09-26 RX ADMIN — SODIUM CHLORIDE PRN UNIT: 9 INJECTION, SOLUTION INTRAVENOUS at 16:53

## 2022-09-26 RX ADMIN — PROPRANOLOL HYDROCHLORIDE SCH MG: 10 TABLET ORAL at 09:00

## 2022-09-26 RX ADMIN — MEMANTINE HYDROCHLORIDE SCH MG: 10 TABLET ORAL at 21:24

## 2022-09-26 RX ADMIN — ATORVASTATIN CALCIUM SCH MG: 20 TABLET, FILM COATED ORAL at 21:18

## 2022-09-26 RX ADMIN — SPIRONOLACTONE SCH MG: 50 TABLET, FILM COATED ORAL at 09:00

## 2022-09-26 RX ADMIN — SODIUM CHLORIDE PRN UNIT: 9 INJECTION, SOLUTION INTRAVENOUS at 22:44

## 2022-09-26 RX ADMIN — LACTULOSE SCH G: 20 SOLUTION ORAL at 17:05

## 2022-09-26 RX ADMIN — RIFAXIMIN SCH MG: 550 TABLET ORAL at 09:00

## 2022-09-26 RX ADMIN — Medication SCH EACH: at 21:00

## 2022-09-26 RX ADMIN — LACTULOSE SCH G: 20 SOLUTION ORAL at 21:18

## 2022-09-26 RX ADMIN — Medication SCH EACH: at 11:33

## 2022-09-26 RX ADMIN — RIFAXIMIN SCH MG: 550 TABLET ORAL at 21:18

## 2022-09-26 RX ADMIN — FUROSEMIDE SCH MG: 40 TABLET ORAL at 09:00

## 2022-09-26 RX ADMIN — OXYBUTYNIN CHLORIDE SCH MG: 5 TABLET, EXTENDED RELEASE ORAL at 21:18

## 2022-09-26 RX ADMIN — DEXTROSE SCH MG: 50 INJECTION, SOLUTION INTRAVENOUS at 09:55

## 2022-09-26 RX ADMIN — LACTULOSE SCH G: 20 SOLUTION ORAL at 09:00

## 2022-09-26 RX ADMIN — LACTULOSE SCH G: 20 SOLUTION ORAL at 13:08

## 2022-09-26 RX ADMIN — CLOTRIMAZOLE SCH GM: 1 CREAM TOPICAL at 17:04

## 2022-09-26 RX ADMIN — PROPRANOLOL HYDROCHLORIDE SCH MG: 10 TABLET ORAL at 17:05

## 2022-09-26 RX ADMIN — SODIUM CHLORIDE SCH MLS/HR: 9 INJECTION, SOLUTION INTRAVENOUS at 17:04

## 2022-09-26 RX ADMIN — Medication SCH EACH: at 16:41

## 2022-09-26 RX ADMIN — TAMSULOSIN HYDROCHLORIDE SCH MG: 0.4 CAPSULE ORAL at 21:18

## 2022-09-26 RX ADMIN — ANORECTAL OINTMENT SCH APPLIC: 15.7; .44; 24; 20.6 OINTMENT TOPICAL at 09:56

## 2022-09-26 RX ADMIN — SODIUM CHLORIDE SCH MLS/HR: 9 INJECTION, SOLUTION INTRAVENOUS at 04:10

## 2022-09-26 RX ADMIN — CLOTRIMAZOLE SCH GM: 1 CREAM TOPICAL at 09:56

## 2022-09-26 NOTE — NUR
pt started to eat today, no coughing/nausea/vomiting. strict aspiration precautions observed 
at all times. safety precautions in place. cont to monitor

## 2022-09-26 NOTE — NUR
Slept well, easy to arouse alert to self and verbally responsive. Still NPO, for swallow 
eval today. No noted acute distress.  No noted facial grimace, no signs of pain/discomfort. 
Needs assessed and attended to. Call light within reach.

## 2022-09-26 NOTE — NUR
Received call from lab at 1140 for lactic acid 2.3, MOUNA Leone made aware no new order 
received at this time

## 2022-09-26 NOTE — NUR
Patient removed Right IJ Iv dressing and attempted to pull out iv lines and tubings, 
redressed Right IJ IV, no bleeding noted, combative towards staff. NP Sulema on the floor 
and ordered for soft wrist restraints and per NP if patient fails swallow eval again today 
to insert ngt. Will cont to monitor.

## 2022-09-26 NOTE — NUR
Awake and verbally responsive. No ss of pain or respiratory distress. On room air. Bledsoe 
cath intact and patent herminia colored urine.  Iv access intact and patent. Safety measures in 
place. Cont to monitor.

## 2022-09-26 NOTE — NUR
Patient's bs at 1120am was 86 mg/dl, was given oj, at this time rechecked bs noted 102 
mg/dl. will cont to monitor.

## 2022-09-26 NOTE — NUR
Seen by Alannah BARRETT with recommendation for pureed, thin liquids no straw . NP Sulema aware. 
Order noted. Cna made aware.

## 2022-09-27 VITALS — DIASTOLIC BLOOD PRESSURE: 60 MMHG | SYSTOLIC BLOOD PRESSURE: 143 MMHG

## 2022-09-27 VITALS — DIASTOLIC BLOOD PRESSURE: 67 MMHG | SYSTOLIC BLOOD PRESSURE: 123 MMHG

## 2022-09-27 VITALS — DIASTOLIC BLOOD PRESSURE: 58 MMHG | SYSTOLIC BLOOD PRESSURE: 118 MMHG

## 2022-09-27 VITALS — DIASTOLIC BLOOD PRESSURE: 59 MMHG | SYSTOLIC BLOOD PRESSURE: 109 MMHG

## 2022-09-27 LAB
BUN SERPL-MCNC: 18 MG/DL (ref 7–18)
CHLORIDE SERPL-SCNC: 117 MMOL/L (ref 98–107)
CO2 SERPL-SCNC: 20 MMOL/L (ref 21–32)
CREAT SERPL-MCNC: 0.8 MG/DL (ref 0.6–1.3)
GLUCOSE SERPL-MCNC: 163 MG/DL (ref 74–106)
HCT VFR BLD AUTO: 33.1 % (ref 36.7–47.1)
LYMPHOCYTES NFR BLD MANUAL: 16 % (ref 20–40)
MAGNESIUM SERPL-MCNC: 2 MG/DL (ref 1.8–2.4)
MCH RBC QN AUTO: 34.7 UUG (ref 23.8–33.4)
MCV RBC AUTO: 102.4 FL (ref 73–96.2)
MONOCYTES NFR BLD MANUAL: 8 % (ref 2–10)
NEUTS SEG NFR BLD MANUAL: 76 % (ref 42–75)
PHOSPHATE SERPL-MCNC: 2.2 MG/DL (ref 2.5–4.9)
PLATELET # BLD AUTO: 83 K/UL (ref 152–348)
POTASSIUM SERPL-SCNC: 3.4 MMOL/L (ref 3.5–5.1)

## 2022-09-27 PROCEDURE — B547ZZA ULTRASONOGRAPHY OF LEFT SUBCLAVIAN VEIN, GUIDANCE: ICD-10-PCS

## 2022-09-27 PROCEDURE — 05H633Z INSERTION OF INFUSION DEVICE INTO LEFT SUBCLAVIAN VEIN, PERCUTANEOUS APPROACH: ICD-10-PCS

## 2022-09-27 PROCEDURE — 05PYX3Z REMOVAL OF INFUSION DEVICE FROM UPPER VEIN, EXTERNAL APPROACH: ICD-10-PCS

## 2022-09-27 RX ADMIN — POTASSIUM CHLORIDE SCH MLS/HR: 14.9 INJECTION, SOLUTION INTRAVENOUS at 10:00

## 2022-09-27 RX ADMIN — TAMSULOSIN HYDROCHLORIDE SCH MG: 0.4 CAPSULE ORAL at 20:37

## 2022-09-27 RX ADMIN — SODIUM CHLORIDE SCH MLS/HR: 9 INJECTION, SOLUTION INTRAVENOUS at 20:37

## 2022-09-27 RX ADMIN — PROPRANOLOL HYDROCHLORIDE SCH MG: 10 TABLET ORAL at 17:27

## 2022-09-27 RX ADMIN — Medication SCH EACH: at 16:38

## 2022-09-27 RX ADMIN — SODIUM CHLORIDE SCH MLS/HR: 9 INJECTION, SOLUTION INTRAVENOUS at 02:10

## 2022-09-27 RX ADMIN — CLOTRIMAZOLE SCH GM: 1 CREAM TOPICAL at 17:28

## 2022-09-27 RX ADMIN — LACTULOSE SCH G: 20 SOLUTION ORAL at 13:22

## 2022-09-27 RX ADMIN — SODIUM CHLORIDE PRN UNIT: 9 INJECTION, SOLUTION INTRAVENOUS at 16:49

## 2022-09-27 RX ADMIN — OXYBUTYNIN CHLORIDE SCH MG: 5 TABLET, EXTENDED RELEASE ORAL at 20:37

## 2022-09-27 RX ADMIN — FLUTICASONE FUROATE AND VILANTEROL TRIFENATATE SCH EACH: 100; 25 POWDER RESPIRATORY (INHALATION) at 13:01

## 2022-09-27 RX ADMIN — ATORVASTATIN CALCIUM SCH MG: 20 TABLET, FILM COATED ORAL at 20:40

## 2022-09-27 RX ADMIN — CLOTRIMAZOLE SCH GM: 1 CREAM TOPICAL at 09:37

## 2022-09-27 RX ADMIN — LACTULOSE SCH G: 20 SOLUTION ORAL at 20:37

## 2022-09-27 RX ADMIN — MEMANTINE HYDROCHLORIDE SCH MG: 10 TABLET ORAL at 20:37

## 2022-09-27 RX ADMIN — SODIUM CHLORIDE PRN UNIT: 9 INJECTION, SOLUTION INTRAVENOUS at 11:27

## 2022-09-27 RX ADMIN — PROPRANOLOL HYDROCHLORIDE SCH MG: 10 TABLET ORAL at 09:37

## 2022-09-27 RX ADMIN — LACTULOSE SCH G: 20 SOLUTION ORAL at 17:17

## 2022-09-27 RX ADMIN — Medication SCH ML: at 13:22

## 2022-09-27 RX ADMIN — RIFAXIMIN SCH MG: 550 TABLET ORAL at 09:18

## 2022-09-27 RX ADMIN — FUROSEMIDE SCH MG: 40 TABLET ORAL at 09:18

## 2022-09-27 RX ADMIN — LACTULOSE SCH G: 20 SOLUTION ORAL at 09:18

## 2022-09-27 RX ADMIN — ANORECTAL OINTMENT SCH APPLIC: 15.7; .44; 24; 20.6 OINTMENT TOPICAL at 09:37

## 2022-09-27 RX ADMIN — SPIRONOLACTONE SCH MG: 50 TABLET, FILM COATED ORAL at 09:19

## 2022-09-27 RX ADMIN — Medication SCH EACH: at 11:26

## 2022-09-27 RX ADMIN — DEXTROSE SCH MG: 50 INJECTION, SOLUTION INTRAVENOUS at 09:19

## 2022-09-27 RX ADMIN — POTASSIUM CHLORIDE SCH MLS/HR: 14.9 INJECTION, SOLUTION INTRAVENOUS at 11:25

## 2022-09-27 RX ADMIN — SODIUM CHLORIDE SCH MLS/HR: 9 INJECTION, SOLUTION INTRAVENOUS at 10:00

## 2022-09-27 RX ADMIN — SODIUM CHLORIDE PRN UNIT: 9 INJECTION, SOLUTION INTRAVENOUS at 21:04

## 2022-09-27 RX ADMIN — RIFAXIMIN SCH MG: 550 TABLET ORAL at 20:37

## 2022-09-27 RX ADMIN — ANORECTAL OINTMENT SCH APPLIC: 15.7; .44; 24; 20.6 OINTMENT TOPICAL at 20:40

## 2022-09-27 RX ADMIN — Medication SCH EACH: at 21:02

## 2022-09-27 NOTE — NUR
RECEIVED REPORT FROM CRESENCIO PT IS ALERT AND ORIENTEDX2-3 PT WAS GIVEN MEDICATIONS AND 
FED PATIENT APPLE JUICE FLUID HAS BE TAKEN WITH A THICKENED LIQUIDS LPT TOLERATED.  WELL NO 
SIGNS OF DISTRESS NOTED. BLOOD SUGAR  GAVE 3 UNITS OF JARAD INSULIN NO  SIGNS OF 
DIAETIC REACTION NOTED WILL CONTINUE TO MONITOR,for falls  and safety.

## 2022-09-27 NOTE — NUR
I spoke with the pharmacist regarding Sodium phosphate that wasn't administered on 9/25 and 
he said to make it as "non-admin"

## 2022-09-28 VITALS — DIASTOLIC BLOOD PRESSURE: 59 MMHG | SYSTOLIC BLOOD PRESSURE: 124 MMHG

## 2022-09-28 VITALS — SYSTOLIC BLOOD PRESSURE: 133 MMHG | DIASTOLIC BLOOD PRESSURE: 62 MMHG

## 2022-09-28 LAB
ALP SERPL-CCNC: 315 U/L (ref 50–136)
ALT SERPL W/O P-5'-P-CCNC: 58 U/L (ref 16–63)
AST SERPL-CCNC: 111 U/L (ref 15–37)
BILIRUB SERPL-MCNC: 2.9 MG/DL (ref 0.2–1)
BUN SERPL-MCNC: 13 MG/DL (ref 7–18)
CHLORIDE SERPL-SCNC: 114 MMOL/L (ref 98–107)
CO2 SERPL-SCNC: 25 MMOL/L (ref 21–32)
CREAT SERPL-MCNC: 0.8 MG/DL (ref 0.6–1.3)
EOSINOPHIL NFR BLD MANUAL: 9 % (ref 0–8)
FERRITIN SERPL-MCNC: 489 NG/ML (ref 26–388)
GLUCOSE SERPL-MCNC: 132 MG/DL (ref 74–106)
HCT VFR BLD AUTO: 33.4 % (ref 36.7–47.1)
IRON SERPL-MCNC: 102 UG/DL (ref 50–175)
LYMPHOCYTES NFR BLD MANUAL: 14 % (ref 20–40)
MAGNESIUM SERPL-MCNC: 1.8 MG/DL (ref 1.8–2.4)
MCH RBC QN AUTO: 35.3 UUG (ref 23.8–33.4)
MCV RBC AUTO: 102.5 FL (ref 73–96.2)
MONOCYTES NFR BLD MANUAL: 5 % (ref 2–10)
NEUTS SEG NFR BLD MANUAL: 72 % (ref 42–75)
PHOSPHATE SERPL-MCNC: 2.6 MG/DL (ref 2.5–4.9)
PLATELET # BLD AUTO: 81 K/UL (ref 152–348)
POTASSIUM SERPL-SCNC: 3.7 MMOL/L (ref 3.5–5.1)
TSH SERPL DL<=0.005 MIU/L-ACNC: 0.86 MIU/ML (ref 0.36–3.74)
WS STN SPEC: 6.9 G/DL (ref 6.4–8.2)

## 2022-09-28 RX ADMIN — LACTULOSE SCH G: 20 SOLUTION ORAL at 08:39

## 2022-09-28 RX ADMIN — SODIUM CHLORIDE SCH MLS/HR: 9 INJECTION, SOLUTION INTRAVENOUS at 10:05

## 2022-09-28 RX ADMIN — LACTULOSE SCH G: 20 SOLUTION ORAL at 12:14

## 2022-09-28 RX ADMIN — Medication SCH EACH: at 06:54

## 2022-09-28 RX ADMIN — Medication SCH EACH: at 12:20

## 2022-09-28 RX ADMIN — Medication SCH ML: at 08:45

## 2022-09-28 RX ADMIN — FUROSEMIDE SCH MG: 40 TABLET ORAL at 08:39

## 2022-09-28 RX ADMIN — SODIUM CHLORIDE PRN UNIT: 9 INJECTION, SOLUTION INTRAVENOUS at 12:23

## 2022-09-28 RX ADMIN — SPIRONOLACTONE SCH MG: 50 TABLET, FILM COATED ORAL at 08:39

## 2022-09-28 RX ADMIN — RIFAXIMIN SCH MG: 550 TABLET ORAL at 08:40

## 2022-09-28 RX ADMIN — PROPRANOLOL HYDROCHLORIDE SCH MG: 10 TABLET ORAL at 08:41

## 2022-09-28 RX ADMIN — CLOTRIMAZOLE SCH GM: 1 CREAM TOPICAL at 08:44

## 2022-09-28 RX ADMIN — FLUTICASONE FUROATE AND VILANTEROL TRIFENATATE SCH EACH: 100; 25 POWDER RESPIRATORY (INHALATION) at 08:40

## 2022-09-28 RX ADMIN — ANORECTAL OINTMENT SCH APPLIC: 15.7; .44; 24; 20.6 OINTMENT TOPICAL at 08:45

## 2022-09-28 RX ADMIN — SODIUM CHLORIDE SCH MLS/HR: 9 INJECTION, SOLUTION INTRAVENOUS at 02:12

## 2022-09-28 RX ADMIN — WHITE PETROLATUM PRN GM: 1 OINTMENT TOPICAL at 08:43

## 2022-09-28 NOTE — NUR
Pt is discharged to [facility] and rehab bed 6a. Report called and given to Malik BALLARD. Pt 
is a/o x 1, confused and farsi speaking. All discharged information and reports included 
with patient to transfer to facility. Family aware and agreeable for SNF, CM spoke and 
discussed with family. IV and ID bands removed. No signs of acute distress noted. Pt SpO2 
94% on room air.

## 2022-09-28 NOTE — NUR
Slept intermittently. No distress noted. IV site intact, tolerated all medications given. 
Bledsoe intact draining to gravity. Safety maintained throughout the shift. Will endorse to 
day shift.